# Patient Record
Sex: MALE | Race: WHITE | NOT HISPANIC OR LATINO | URBAN - METROPOLITAN AREA
[De-identification: names, ages, dates, MRNs, and addresses within clinical notes are randomized per-mention and may not be internally consistent; named-entity substitution may affect disease eponyms.]

---

## 2020-06-11 ENCOUNTER — INPATIENT (INPATIENT)
Facility: HOSPITAL | Age: 55
LOS: 8 days | Discharge: HOME | DRG: 950 | End: 2020-06-20
Attending: STUDENT IN AN ORGANIZED HEALTH CARE EDUCATION/TRAINING PROGRAM | Admitting: STUDENT IN AN ORGANIZED HEALTH CARE EDUCATION/TRAINING PROGRAM
Payer: MEDICAID

## 2020-06-11 VITALS
RESPIRATION RATE: 15 BRPM | TEMPERATURE: 98 F | HEART RATE: 72 BPM | WEIGHT: 165.79 LBS | SYSTOLIC BLOOD PRESSURE: 132 MMHG | HEIGHT: 66 IN | DIASTOLIC BLOOD PRESSURE: 87 MMHG | OXYGEN SATURATION: 96 %

## 2020-06-11 DIAGNOSIS — I63.9 CEREBRAL INFARCTION, UNSPECIFIED: ICD-10-CM

## 2020-06-11 RX ORDER — ENOXAPARIN SODIUM 100 MG/ML
40 INJECTION SUBCUTANEOUS DAILY
Refills: 0 | Status: DISCONTINUED | OUTPATIENT
Start: 2020-06-11 | End: 2020-06-20

## 2020-06-11 RX ORDER — ATORVASTATIN CALCIUM 80 MG/1
40 TABLET, FILM COATED ORAL AT BEDTIME
Refills: 0 | Status: DISCONTINUED | OUTPATIENT
Start: 2020-06-11 | End: 2020-06-20

## 2020-06-11 RX ORDER — MECLIZINE HCL 12.5 MG
25 TABLET ORAL EVERY 8 HOURS
Refills: 0 | Status: DISCONTINUED | OUTPATIENT
Start: 2020-06-11 | End: 2020-06-15

## 2020-06-11 RX ORDER — AMLODIPINE BESYLATE 2.5 MG/1
5 TABLET ORAL DAILY
Refills: 0 | Status: DISCONTINUED | OUTPATIENT
Start: 2020-06-11 | End: 2020-06-20

## 2020-06-11 RX ORDER — ACETAMINOPHEN 500 MG
650 TABLET ORAL EVERY 6 HOURS
Refills: 0 | Status: DISCONTINUED | OUTPATIENT
Start: 2020-06-11 | End: 2020-06-20

## 2020-06-11 RX ORDER — ASPIRIN/CALCIUM CARB/MAGNESIUM 324 MG
81 TABLET ORAL DAILY
Refills: 0 | Status: DISCONTINUED | OUTPATIENT
Start: 2020-06-11 | End: 2020-06-20

## 2020-06-11 RX ORDER — SENNA PLUS 8.6 MG/1
2 TABLET ORAL AT BEDTIME
Refills: 0 | Status: DISCONTINUED | OUTPATIENT
Start: 2020-06-11 | End: 2020-06-20

## 2020-06-11 RX ORDER — CLOPIDOGREL BISULFATE 75 MG/1
75 TABLET, FILM COATED ORAL DAILY
Refills: 0 | Status: DISCONTINUED | OUTPATIENT
Start: 2020-06-11 | End: 2020-06-20

## 2020-06-11 RX ADMIN — SENNA PLUS 2 TABLET(S): 8.6 TABLET ORAL at 22:20

## 2020-06-11 RX ADMIN — ATORVASTATIN CALCIUM 40 MILLIGRAM(S): 80 TABLET, FILM COATED ORAL at 22:20

## 2020-06-11 NOTE — H&P ADULT - HISTORY OF PRESENT ILLNESS
Pt is a 56 y/o with PMHx of poorly controlled HTN who presented to [facility] on 6/4/20 with left sided facial numbness and left sided body numbness upon waking. The numbness was associated with diplopia, nausea, and 1 episode of NBNB vomiting. Pt also noticed paresthesias in his LUE. MRI revealed an acute infarct in the right anisa. Pt was COVID negative upon admission. Pt's course also showed calcified right lung granulomas on CT chest.

## 2020-06-11 NOTE — H&P ADULT - NSHPREVIEWOFSYSTEMS_GEN_ALL_CORE
CONSTITUTIONAL: No fevers or chills  EYES/ENT: +mild blurriness;  No vertigo or throat pain   NECK: No pain or stiffness  RESPIRATORY: No cough, wheezing, or shortness of breath  CARDIOVASCULAR: No chest pain or palpitations  GASTROINTESTINAL: No abdominal or epigastric pain. No nausea or vomiting. No diarrhea or constipation.   GENITOURINARY: No dysuria, frequency or hematuria  NEUROLOGICAL: + numbness left hand, +mild weakness  SKIN: No itching, rashes

## 2020-06-11 NOTE — H&P ADULT - ATTENDING COMMENTS
Pt. seen 6/12/20 AM.  Agree with documentation above as per resident on call with amendments made as appropriate. Patient medically stable. Appropriate for acute rehabilitation.      Spoke with pt. via [employer]-- Pt. reports difficulty sleeping at night due to noise in the room.  He declines taking sleep medication for tonight and requesting ear plugs as needed.  Pt. denies pain.  reports some lightheadedness but seems to be mild.  No vertigo.  on enhanced supervision for impulsivity.  no agitation overnight.    Vital Signs Last 24 Hrs  T(C): 36.3 (12 Jun 2020 08:03), Max: 36.6 (11 Jun 2020 20:00)  T(F): 97.4 (12 Jun 2020 08:03), Max: 97.9 (11 Jun 2020 20:00)  HR: 53 (12 Jun 2020 08:03) (53 - 72)  BP: 149/97 (12 Jun 2020 08:03) (130/92 - 149/97)  BP(mean): --  RR: 14 (12 Jun 2020 08:03) (14 - 15)  SpO2: 98% (12 Jun 2020 08:03) (96% - 98%)    Physical exam as amended above                          14.7   7.51  )-----------( 286      ( 12 Jun 2020 05:51 )             44.9   06-12    139  |  106  |  16  ----------------------------<  94  3.9   |  25  |  0.84    Ca    8.7      12 Jun 2020 05:51     54 y/o with PMHx of poorly controlled HTN who presented to [facility] on 6/4/20 with left sided facial numbness and left sided body numbness upon waking. Pt was found to have an acute infarct of his right anisa on MRI now with vestibular disorder, sensory impairment, gait and ADL impairment and impulsivity.     Dizziness-- central due to CVA,  Will stop Meclizine as can have sedating/cognitive SEs.  Pt. has not been needing and his symptoms seem to be tolerable.      Sleep-- add Melatonin PRN.    cont. other medications.    Rehab plan of care explained to pt. All questions answered.

## 2020-06-11 NOTE — H&P ADULT - NSHPSOCIALHISTORY_GEN_ALL_CORE
SOCIAL HISTORY  - Smoking: former smoker, quit 30 years ago  - Alcohol: occasionally consumes alcohol  - Drug Use: denied    FUNCTIONAL HISTORY  Pt lives in a private home with his spouse and has 2 children. The house has 10 steps to enter. Prior to admission, pt was fully independent with ADLs and ambulation.    CURRENT FUNCTIONAL STATUS  Per PT note 6/10/20  Sit/stand CG and RW  Ambulation 30'x2 with RW and Min A  From a seated position in chair, patient performed long-arch quads, seated rows for posture using single-axis cane and manual resistance, and hip abductions against manual resistance 1x10 each. Patient requires verbal and visual cues for proper execution of therapeutic exercises. SOCIAL HISTORY  - Smoking: former smoker, quit 30 years ago  - Alcohol: occasionally consumes alcohol  - Drug Use: denied    FUNCTIONAL HISTORY  Pt lives in a private home in [place] with his spouse and has 2 children. The house has 10 steps to enter. Prior to admission, pt was fully independent with ADLs and ambulation.  He was studying for a [employer]    CURRENT FUNCTIONAL STATUS  Per PT note 6/10/20  Sit/stand CG and RW  Ambulation 30'x2 with RW and Min A  From a seated position in chair, patient performed long-arch quads, seated rows for posture using single-axis cane and manual resistance, and hip abductions against manual resistance 1x10 each. Patient requires verbal and visual cues for proper execution of therapeutic exercises.

## 2020-06-11 NOTE — H&P ADULT - NSHPLABSRESULTS_GEN_ALL_CORE
Laboratory-Chemistry:    6/8/20   Glucose: 130    Sodium: 140    Potassium: 4.0    Blood Urea Nitrogen : 16.6    Creatinine: 0.86   Hematology:    6/8/20   WBC: 6.09    HgB: 14.9    Hct: 46.1    Platelets: 299   Cultures:     6/4/20 SARS CoV 2 PCR - Not detected   Radiology:     6/4/20 CT head  Unremarkable head CT examination.    6/4/20 CTA Head/Neck  No significant stenosis in the cervical arterial vasculature.   6/4/20 CXR  Mild pulmonary scarring and probable calcified right lung granulomas.     6/4/20 CT Chest  NUMEROUS CALCIFIED GRANULOMAS IN THE RIGHT LUNG AS WELL AS SCARRING WITH VOLUME LOSS RIGHT UPPER LOBE CALCIFIED MEDIASTINAL LYMPH NODES THE ABOVE FINDINGS CONSISTENT WITH PRIOR GRANULOMATOUS INFECTION.    6/5/20 MRI Brain  Acute infarct in the right anisa. No acute intracranial hemorrhage. Chronic left corona radiata lacunar infarct. Minimal chronic small vessel ischemic changes in the cerebral white matter.   IVs:  IV Access: Saline Lock

## 2020-06-11 NOTE — H&P ADULT - NSHPPHYSICALEXAM_GEN_ALL_CORE
Constitutional - NAD, Comfortably lying in bed  HEENT - NCAT, EOMI  Neck - Supple, No limited ROM  Chest - Breathing comfortably, No wheezing, clear to auscultation bilaterally  Cardiovascular - S1S2, RRR  Abdomen - Soft, nontender, nondistended, normoactive bowel sounds   Extremities - No C/C/E, No calf tenderness   Neurologic Exam -                    Cognitive - Awake, Alert, AAO to self, place, date, year, situation     Communication - Fluent, very mild dysarthria, repetition intact     Cranial Nerves - mild left facial droop, EOMI, tongue midline, no facial numbness     Motor -                     LEFT    UE - ShAB 5/5, EF 5/5, EE 5/5, WE 4/5,  4/5                    RIGHT UE - ShAB 5/5, EF 5/5, EE 5/5, WE 5/5,  5/5                    LEFT    LE - HF 5/5, KE 5/5, DF 5/5, PF 5/5                    RIGHT LE - HF 5/5, KE 5/5, DF 5/5, PF 5/5        Sensory - deficit at left palm     Coordination - FTN intact, slightly more clumsy with left hand  Psychiatric - Mood stable, Affect WNL Constitutional - NAD, Comfortably lying in bed  HEENT - NCAT, EOMI  Neck - Supple, No limited ROM  Chest - Breathing comfortably, No wheezing, clear to auscultation bilaterally  Cardiovascular - S1S2, RRR  Abdomen - Soft, nontender, nondistended, normoactive bowel sounds   Extremities - No C/C/E, No calf tenderness   Neurologic Exam -                    Cognitive - Awake, Alert, AAO to self, place, date, year, situation    Recall: 3/3 spontaneously after 3 mins     Attn: good     Communication - Fluent, very mild dysarthria, repetition intact     Cranial Nerves - mild left facial droop, EOMI, tongue midline, no facial numbness     Motor -                     LEFT    UE - ShAB 5/5, EF 5/5, EE 5/5, WE 4/5,  4/5                    RIGHT UE - ShAB 5/5, EF 5/5, EE 5/5, WE 5/5,  5/5                    LEFT    LE - HF 5/5, KE 5/5, DF 5/5, PF 5/5                    RIGHT LE - HF 5/5, KE 5/5, DF 5/5, PF 5/5        Sensory - deficit at left palm     Coordination - FTN intact, HTS intact  Psychiatric - Mood stable, Affect WNL

## 2020-06-11 NOTE — H&P ADULT - ASSESSMENT
Pt is a 56 y/o with PMHx of poorly controlled HTN who presented to [facility] on 6/4/20 with left sided facial numbness and left sided body numbness upon waking. Pt was found to have an acute infarct of his right anisa on MRI.    #CVA  - right sided anisa infarct  - Aspirin  - Plavix  - Lipitor  - Meclizine prn for dizziness  - PT/OT/SLP  - BP control    #HTN  - Amlodipine 5mg daily  - Hospitalist follow up    #Pain  - Tylenol PRN    #GI  - Senna    #Diet  - Regular    #DVT ppx  - Lovenox    MEDICAL PROGNOSIS: GOOD            REHAB POTENTIAL: GOOD             ESTIMATED DISPOSITION: HOME WITH HOME CARE            ELOS: 10-14 Days   EXPECTED THERAPY:     P.T. 1hr/day       O.T. 1hr/day      S.L.P. 1hr/day      EXP FREQUENCY: 5 days per 7 day period     PRESCREEN COMPARISON:   I have reviewed the prescreen information and I have found no relevant changes between the preadmission screening and my post admission evaluation     RATIONALE FOR INPATIENT ADMISSION - Patient demonstrates the following: (check all that apply)  [X] Medically appropriate for rehabilitation admission  [X] Has attainable rehab goals with an appropriate initial discharge plan  [X] Has rehabilitation potential (expected to make a significant improvement within a reasonable period of time)   [X] Requires close medical management by a rehab physician, rehab nursing care, Hospitalist and comprehensive interdisciplinary team (including PT, OT, & or SLP, Prosthetics and Orthotics) Pt is a 54 y/o with PMHx of poorly controlled HTN who presented to [facility] on 6/4/20 with left sided facial numbness and left sided body numbness upon waking. Pt was found to have an acute infarct of his right anisa on MRI now with vestibular disorder, sensory impairment, gait and ADL impairment and impulsivity.     #CVA  - right sided anisa infarct  - Aspirin  - Plavix  - Lipitor  - Meclizine prn for dizziness  - PT/OT/SLP  - BP control    #HTN  - Amlodipine 5mg daily  - Hospitalist follow up    #Pain  - Tylenol PRN    #GI  - Senna    #Diet  - Regular    #DVT ppx  - Lovenox    MEDICAL PROGNOSIS: GOOD            REHAB POTENTIAL: GOOD             ESTIMATED DISPOSITION: HOME WITH HOME CARE            ELOS: 10-14 Days   EXPECTED THERAPY:     P.T. 1hr/day       O.T. 1hr/day      S.L.P. 1hr/day      EXP FREQUENCY: 5 days per 7 day period     PRESCREEN COMPARISON:   I have reviewed the prescreen information and I have found no relevant changes between the preadmission screening and my post admission evaluation     RATIONALE FOR INPATIENT ADMISSION - Patient demonstrates the following: (check all that apply)  [X] Medically appropriate for rehabilitation admission  [X] Has attainable rehab goals with an appropriate initial discharge plan  [X] Has rehabilitation potential (expected to make a significant improvement within a reasonable period of time)   [X] Requires close medical management by a rehab physician, rehab nursing care, Hospitalist and comprehensive interdisciplinary team (including PT, OT, & or SLP, Prosthetics and Orthotics)

## 2020-06-12 LAB
ANION GAP SERPL CALC-SCNC: 8 MMOL/L — SIGNIFICANT CHANGE UP (ref 5–17)
BUN SERPL-MCNC: 16 MG/DL — SIGNIFICANT CHANGE UP (ref 7–23)
CALCIUM SERPL-MCNC: 8.7 MG/DL — SIGNIFICANT CHANGE UP (ref 8.4–10.5)
CHLORIDE SERPL-SCNC: 106 MMOL/L — SIGNIFICANT CHANGE UP (ref 96–108)
CO2 SERPL-SCNC: 25 MMOL/L — SIGNIFICANT CHANGE UP (ref 22–31)
CREAT SERPL-MCNC: 0.84 MG/DL — SIGNIFICANT CHANGE UP (ref 0.5–1.3)
GLUCOSE SERPL-MCNC: 94 MG/DL — SIGNIFICANT CHANGE UP (ref 70–99)
HCT VFR BLD CALC: 44.9 % — SIGNIFICANT CHANGE UP (ref 39–50)
HCV AB S/CO SERPL IA: 0.15 S/CO — SIGNIFICANT CHANGE UP (ref 0–0.99)
HCV AB SERPL-IMP: SIGNIFICANT CHANGE UP
HGB BLD-MCNC: 14.7 G/DL — SIGNIFICANT CHANGE UP (ref 13–17)
MCHC RBC-ENTMCNC: 29.4 PG — SIGNIFICANT CHANGE UP (ref 27–34)
MCHC RBC-ENTMCNC: 32.7 GM/DL — SIGNIFICANT CHANGE UP (ref 32–36)
MCV RBC AUTO: 89.8 FL — SIGNIFICANT CHANGE UP (ref 80–100)
NRBC # BLD: 0 /100 WBCS — SIGNIFICANT CHANGE UP (ref 0–0)
PLATELET # BLD AUTO: 286 K/UL — SIGNIFICANT CHANGE UP (ref 150–400)
POTASSIUM SERPL-MCNC: 3.9 MMOL/L — SIGNIFICANT CHANGE UP (ref 3.5–5.3)
POTASSIUM SERPL-SCNC: 3.9 MMOL/L — SIGNIFICANT CHANGE UP (ref 3.5–5.3)
RBC # BLD: 5 M/UL — SIGNIFICANT CHANGE UP (ref 4.2–5.8)
RBC # FLD: 12.6 % — SIGNIFICANT CHANGE UP (ref 10.3–14.5)
SARS-COV-2 IGG SERPL QL IA: NEGATIVE — SIGNIFICANT CHANGE UP
SARS-COV-2 IGM SERPL IA-ACNC: <0.1 INDEX — SIGNIFICANT CHANGE UP
SARS-COV-2 RNA SPEC QL NAA+PROBE: SIGNIFICANT CHANGE UP
SODIUM SERPL-SCNC: 139 MMOL/L — SIGNIFICANT CHANGE UP (ref 135–145)
WBC # BLD: 7.51 K/UL — SIGNIFICANT CHANGE UP (ref 3.8–10.5)
WBC # FLD AUTO: 7.51 K/UL — SIGNIFICANT CHANGE UP (ref 3.8–10.5)

## 2020-06-12 PROCEDURE — 99223 1ST HOSP IP/OBS HIGH 75: CPT

## 2020-06-12 PROCEDURE — 99223 1ST HOSP IP/OBS HIGH 75: CPT | Mod: GC

## 2020-06-12 RX ADMIN — Medication 650 MILLIGRAM(S): at 12:27

## 2020-06-12 RX ADMIN — CLOPIDOGREL BISULFATE 75 MILLIGRAM(S): 75 TABLET, FILM COATED ORAL at 12:26

## 2020-06-12 RX ADMIN — AMLODIPINE BESYLATE 5 MILLIGRAM(S): 2.5 TABLET ORAL at 06:30

## 2020-06-12 RX ADMIN — ATORVASTATIN CALCIUM 40 MILLIGRAM(S): 80 TABLET, FILM COATED ORAL at 20:20

## 2020-06-12 RX ADMIN — Medication 81 MILLIGRAM(S): at 12:26

## 2020-06-12 RX ADMIN — SENNA PLUS 2 TABLET(S): 8.6 TABLET ORAL at 20:20

## 2020-06-12 RX ADMIN — ENOXAPARIN SODIUM 40 MILLIGRAM(S): 100 INJECTION SUBCUTANEOUS at 12:26

## 2020-06-12 NOTE — CONSULT NOTE ADULT - ASSESSMENT
Mr. Acosta is a pleasant 55 year-old male with hx of HTN that was found to have an acute ischemic infarct of his right anisa. He is currently admitted to [facility] for acute rehabilitation of his functional deficits following his CVA.       Problem  1.	CVA  2.	Hypertension  3.	Hyperlipidemia   4.	Constipation    Plan  1.	ASA 81mg qd, plavix 75mg qd, atorvastatin 40mg qhs, meclizine prn for dizzines  2.	amlodipine 5mg qd with hold parameters. will monitor for 24 hours. If persistently above 140 will start HCTZ 12.5mg qd.   3.	statin as above. please obtain lipid profile in AM so we can target his LDL following stroke  4.	senna qd    Diet: per primary  Pain Control: per primary  DVT ppx: lovenox  Case discussed with primary team  If a clinical question should arise regarding this patient, please do not hesitate to contact me at [phone] until 7pm. If after 7pm, please contact on-call hospitalist

## 2020-06-12 NOTE — DIETITIAN INITIAL EVALUATION ADULT. - OTHER INFO
Pt is a 54 y/o with PMHx of poorly controlled HTN now s/p acute infarct of his right anisa on MRI now with vestibular disorder, sensory impairment, gait and ADL impairment and impulsivity.     Pt seen at lunch with >75% intake of meal observed. Pt is Mandarin speaking, but can communicate somewhat in English.  phone offered x 3, but pt declined. Pt reports excellent appetite. He denies food allergies/intolerance, no recent weight changes. Denies GI distress- last BM 6/11. Reviewed importance of avoiding added salt/salty foods. Pt reports good understanding. Recommend change diet to DASH/TLC. No skin breakdown or edema noted. Menu reviewed and preferences obtained.

## 2020-06-12 NOTE — CONSULT NOTE ADULT - SUBJECTIVE AND OBJECTIVE BOX
Mr Karla is a pleasant 55 year-ol male that presented with a chief complaint of weakness in the setting of a CVA.       HPI:  Mr Karla is a pleasant 55 year-old male with hx of HTN that presented on 6/4 with acute onset left sided facial and body numbness. These symptoms were present in the setting of concurrent diplopia, nausea and vomiting. MR brain showed an acute infarct in the right anisa. Hospital course was also notable for a calcified right lung granuloma. He was medically optimized and sent to [facility] for acute rehabilitation of his functional deficits.     Discussed with patient via  the purpose of rehab and the need to inform nursing staff of any changes that he experiences so we can address them promptly.  He expresses understanding of this. He notes marked improvement of his symptoms since his initial presentation to the hospital.           PAST MEDICAL & SURGICAL HISTORY:  HTN (hypertension)    FMHx: Mother with DM          Substance Use (street drugs): denies  Tobacco Usage:  former smoker   Alcohol Usage: denies  Sexual History: denies  Recent Travel: denies      Allergies    No Known Allergies    Intolerances            REVIEW OF SYSTEMS:  CONSTITUTIONAL: No fever, weight loss, or fatigue  EYES: No eye pain, visual disturbances, or discharge  ENMT:  No difficulty hearing, tinnitus, vertigo; No sinus or throat pain  NECK: No pain or stiffness  RESPIRATORY: No cough, wheezing, chills or hemoptysis; No shortness of breath  CARDIOVASCULAR: No chest pain, palpitations, dizziness, or leg swelling  GASTROINTESTINAL: No abdominal or epigastric pain. No nausea, vomiting, or hematemesis; No diarrhea or constipation. No melena or hematochezia.  GENITOURINARY: No dysuria, frequency, hematuria, or incontinence  NEUROLOGICAL: No headaches, memory loss, loss of strength, numbness, or tremors  SKIN: No itching, burning, rashes, or lesions   LYMPH NODES: No enlarged glands  ENDOCRINE: No heat or cold intolerance; No hair loss  MUSCULOSKELETAL: No joint pain or swelling; No muscle, back, or extremity pain  PSYCHIATRIC: No depression, anxiety, mood swings, or difficulty sleeping  HEME/LYMPH: No easy bruising, or bleeding gums  ALLERY AND IMMUNOLOGIC: No hives or eczema    ALL ROS REVIEWED AND NORMAL EXCEPT AS STATED ABOVE    T(C): 36.3 (06-12-20 @ 08:03), Max: 36.6 (06-11-20 @ 20:00)  HR: 53 (06-12-20 @ 08:03) (53 - 72)  BP: 149/97 (06-12-20 @ 08:03) (130/92 - 149/97)  RR: 14 (06-12-20 @ 08:03) (14 - 15)  SpO2: 98% (06-12-20 @ 08:03) (96% - 98%)  Wt(kg): --Vital Signs Last 24 Hrs  T(C): 36.3 (12 Jun 2020 08:03), Max: 36.6 (11 Jun 2020 20:00)  T(F): 97.4 (12 Jun 2020 08:03), Max: 97.9 (11 Jun 2020 20:00)  HR: 53 (12 Jun 2020 08:03) (53 - 72)  BP: 149/97 (12 Jun 2020 08:03) (130/92 - 149/97)  BP(mean): --  RR: 14 (12 Jun 2020 08:03) (14 - 15)  SpO2: 98% (12 Jun 2020 08:03) (96% - 98%)    PHYSICAL EXAM:  GENERAL: NAD, well-groomed, well-developed  HEAD:  Atraumatic, Normocephalic  EYES: EOMI, PERRLA, conjunctiva and sclera clear  ENMT: No tonsillar erythema, exudates, or enlargement; Moist mucous membranes, Good dentition, No lesions  NECK: Supple, No JVD, Normal thyroid  NERVOUS SYSTEM:  Alert & Oriented X3, Good concentration; Motor Strength 5/5 B/L upper and lower extremities;   CHEST/LUNG: Clear to percussion bilaterally; No rales, rhonchi, wheezing, or rubs  HEART: Regular rate and rhythm; No murmurs, rubs, or gallops  ABDOMEN: Soft, Nontender, Nondistended; Bowel sounds present  EXTREMITIES:  2+ Peripheral Pulses, No clubbing, cyanosis, or edema  LYMPH: No lymphadenopathy noted  SKIN: No rashes or lesions    LABS:                        14.7   7.51  )-----------( 286      ( 12 Jun 2020 05:51 )             44.9     06-12    139  |  106  |  16  ----------------------------<  94  3.9   |  25  |  0.84    Ca    8.7      12 Jun 2020 05:51           CAPILLARY BLOOD GLUCOSE                RADIOLOGY & ADDITIONAL TESTS:    Consultant(s) Notes Reviewed:  [x ] YES  [ ] NO  Care Discussed with Consultants/Other Providers [ x] YES  [ ] NO

## 2020-06-12 NOTE — DIETITIAN INITIAL EVALUATION ADULT. - PHYSICAL APPEARANCE
well nourished/other (specify)/No overt signs of muscle wasting/fat loss Height: 5'6", Weight: (6/12) 171.9lbs, BMI: 27.8  IBW: 142lbs +/- 10%

## 2020-06-13 LAB
CHOLEST SERPL-MCNC: 105 MG/DL — SIGNIFICANT CHANGE UP (ref 10–199)
HDLC SERPL-MCNC: 37 MG/DL — LOW
LIPID PNL WITH DIRECT LDL SERPL: 57 MG/DL — SIGNIFICANT CHANGE UP
TOTAL CHOLESTEROL/HDL RATIO MEASUREMENT: 2.9 RATIO — LOW (ref 3.4–9.6)
TRIGL SERPL-MCNC: 57 MG/DL — SIGNIFICANT CHANGE UP (ref 10–149)

## 2020-06-13 PROCEDURE — 99232 SBSQ HOSP IP/OBS MODERATE 35: CPT

## 2020-06-13 PROCEDURE — 99232 SBSQ HOSP IP/OBS MODERATE 35: CPT | Mod: GC

## 2020-06-13 RX ORDER — HYDROCHLOROTHIAZIDE 25 MG
12.5 TABLET ORAL
Refills: 0 | Status: DISCONTINUED | OUTPATIENT
Start: 2020-06-13 | End: 2020-06-20

## 2020-06-13 RX ADMIN — AMLODIPINE BESYLATE 5 MILLIGRAM(S): 2.5 TABLET ORAL at 05:34

## 2020-06-13 RX ADMIN — Medication 12.5 MILLIGRAM(S): at 11:23

## 2020-06-13 RX ADMIN — Medication 81 MILLIGRAM(S): at 11:23

## 2020-06-13 RX ADMIN — ATORVASTATIN CALCIUM 40 MILLIGRAM(S): 80 TABLET, FILM COATED ORAL at 20:51

## 2020-06-13 RX ADMIN — CLOPIDOGREL BISULFATE 75 MILLIGRAM(S): 75 TABLET, FILM COATED ORAL at 11:23

## 2020-06-13 RX ADMIN — SENNA PLUS 2 TABLET(S): 8.6 TABLET ORAL at 20:51

## 2020-06-13 RX ADMIN — ENOXAPARIN SODIUM 40 MILLIGRAM(S): 100 INJECTION SUBCUTANEOUS at 11:23

## 2020-06-13 NOTE — PROGRESS NOTE ADULT - SUBJECTIVE AND OBJECTIVE BOX
Patient seen and examined at bedside. No overnight events per nursing or chart review. Patient is without major complaints as his 10 point ROS is negative. He does note some improvement in his dizziness.     ALLERGIES:  No Known Allergies    MEDICATIONS  (STANDING):  amLODIPine   Tablet 5 milliGRAM(s) Oral daily  aspirin enteric coated 81 milliGRAM(s) Oral daily  atorvastatin 40 milliGRAM(s) Oral at bedtime  clopidogrel Tablet 75 milliGRAM(s) Oral daily  enoxaparin Injectable 40 milliGRAM(s) SubCutaneous daily  senna 2 Tablet(s) Oral at bedtime    MEDICATIONS  (PRN):  acetaminophen   Tablet .. 650 milliGRAM(s) Oral every 6 hours PRN Temp greater or equal to 38C (100.4F), Mild Pain (1 - 3)  meclizine 25 milliGRAM(s) Oral every 8 hours PRN Dizziness    Vital Signs Last 24 Hrs  T(F): 97.8 (12 Jun 2020 20:16), Max: 97.8 (12 Jun 2020 20:16)  HR: 77 (13 Jun 2020 05:33) (73 - 77)  BP: 132/80 (13 Jun 2020 05:33) (132/80 - 149/98)  RR: 14 (12 Jun 2020 20:16) (14 - 14)  SpO2: 96% (12 Jun 2020 20:16) (96% - 96%)  I&O's Summary    BMI (kg/m2): 26.8 (06-11-20 @ 20:00)  PHYSICAL EXAM:  Gen: nad, resting in bed  Neuro: aaox3, no focal deficits  Heent: eomi b/l, no jvd, no oral exudates  Pulm: cta b/l, no w/r/r  CV: +s1s2, no m/r/g  Ab: soft, nt/nd, normoactive bs x 4  Extrem: no edema, pulses intact and equal      LABS:                        14.7   7.51  )-----------( 286      ( 12 Jun 2020 05:51 )             44.9       06-12    139  |  106  |  16  ----------------------------<  94  3.9   |  25  |  0.84    Ca    8.7      12 Jun 2020 05:51       eGFR if Non African American: 97 mL/min/1.73M2 (06-12-20 @ 05:51)  eGFR if : 113 mL/min/1.73M2 (06-12-20 @ 05:51)

## 2020-06-14 PROCEDURE — 99232 SBSQ HOSP IP/OBS MODERATE 35: CPT

## 2020-06-14 PROCEDURE — 99232 SBSQ HOSP IP/OBS MODERATE 35: CPT | Mod: GC

## 2020-06-14 RX ORDER — POLYETHYLENE GLYCOL 3350 17 G/17G
17 POWDER, FOR SOLUTION ORAL DAILY
Refills: 0 | Status: DISCONTINUED | OUTPATIENT
Start: 2020-06-14 | End: 2020-06-20

## 2020-06-14 RX ADMIN — Medication 12.5 MILLIGRAM(S): at 11:24

## 2020-06-14 RX ADMIN — SENNA PLUS 2 TABLET(S): 8.6 TABLET ORAL at 20:46

## 2020-06-14 RX ADMIN — ATORVASTATIN CALCIUM 40 MILLIGRAM(S): 80 TABLET, FILM COATED ORAL at 20:46

## 2020-06-14 RX ADMIN — CLOPIDOGREL BISULFATE 75 MILLIGRAM(S): 75 TABLET, FILM COATED ORAL at 11:24

## 2020-06-14 RX ADMIN — ENOXAPARIN SODIUM 40 MILLIGRAM(S): 100 INJECTION SUBCUTANEOUS at 11:24

## 2020-06-14 RX ADMIN — Medication 81 MILLIGRAM(S): at 11:24

## 2020-06-14 RX ADMIN — POLYETHYLENE GLYCOL 3350 17 GRAM(S): 17 POWDER, FOR SOLUTION ORAL at 20:45

## 2020-06-14 RX ADMIN — AMLODIPINE BESYLATE 5 MILLIGRAM(S): 2.5 TABLET ORAL at 05:29

## 2020-06-14 NOTE — PROGRESS NOTE ADULT - SUBJECTIVE AND OBJECTIVE BOX
Cc: Gait dysfunction    HPI: Patient with no new medical issues today.  HCTZ started for elevated BP- appreciate hospitalist f/u.   Pain controlled, no chest pain, no N/V, no Fevers/Chills. No other new ROS  Has been tolerating rehabilitation program.    MEDICATIONS  (STANDING):  amLODIPine   Tablet 5 milliGRAM(s) Oral daily  aspirin enteric coated 81 milliGRAM(s) Oral daily  atorvastatin 40 milliGRAM(s) Oral at bedtime  clopidogrel Tablet 75 milliGRAM(s) Oral daily  enoxaparin Injectable 40 milliGRAM(s) SubCutaneous daily  hydrochlorothiazide 12.5 milliGRAM(s) Oral <User Schedule>  senna 2 Tablet(s) Oral at bedtime    MEDICATIONS  (PRN):  acetaminophen   Tablet .. 650 milliGRAM(s) Oral every 6 hours PRN Temp greater or equal to 38C (100.4F), Mild Pain (1 - 3)  meclizine 25 milliGRAM(s) Oral every 8 hours PRN Dizziness    Vital Signs Last 24 Hrs  T(C): 36.6 (13 Jun 2020 19:51), Max: 36.6 (13 Jun 2020 19:51)  T(F): 97.8 (13 Jun 2020 19:51), Max: 97.8 (13 Jun 2020 19:51)  HR: 53 (14 Jun 2020 05:28) (53 - 82)  BP: 145/85 (14 Jun 2020 05:28) (136/87 - 150/95)  BP(mean): --  RR: 15 (13 Jun 2020 19:51) (15 - 16)  SpO2: 96% (13 Jun 2020 19:51) (96% - 99%)    In NAD  HEENT- EOMI  Heart- Flip, S1S2  Lungs- CTA bl.  Abd- + BS, NT  Ext- No calf pain  Neuro- Exam unchanged      Imp: Patient with diagnosis of CVA admitted for comprehensive acute rehabilitation.    Plan:  - Continue therapies  - DVT prophylaxis- Lovenox  - Skin- Turn q2h, check skin daily  - Continue to monitor BP on HCTZ  - Continue current medications; patient medically stable.   - Patient is stable to continue current rehabilitation program.

## 2020-06-14 NOTE — PROGRESS NOTE ADULT - SUBJECTIVE AND OBJECTIVE BOX
Patient seen and examined at bedside. No overnight events per nursing or chart review. Patient notes that he has had some neck pain that radiates to his occiput. He states that pain is resolved after aspirin administration     ALLERGIES:  No Known Allergies    MEDICATIONS  (STANDING):  amLODIPine   Tablet 5 milliGRAM(s) Oral daily  aspirin enteric coated 81 milliGRAM(s) Oral daily  atorvastatin 40 milliGRAM(s) Oral at bedtime  clopidogrel Tablet 75 milliGRAM(s) Oral daily  enoxaparin Injectable 40 milliGRAM(s) SubCutaneous daily  hydrochlorothiazide 12.5 milliGRAM(s) Oral <User Schedule>  senna 2 Tablet(s) Oral at bedtime    MEDICATIONS  (PRN):  acetaminophen   Tablet .. 650 milliGRAM(s) Oral every 6 hours PRN Temp greater or equal to 38C (100.4F), Mild Pain (1 - 3)  meclizine 25 milliGRAM(s) Oral every 8 hours PRN Dizziness    Vital Signs Last 24 Hrs  T(F): 97.8 (14 Jun 2020 07:45), Max: 97.8 (13 Jun 2020 19:51)  HR: 61 (14 Jun 2020 07:45) (53 - 82)  BP: 125/86 (14 Jun 2020 07:45) (125/86 - 150/95)  RR: 12 (14 Jun 2020 07:45) (12 - 16)  SpO2: 100% (14 Jun 2020 07:45) (96% - 100%)  I&O's Summary    13 Jun 2020 07:01  -  14 Jun 2020 07:00  --------------------------------------------------------  IN: 600 mL / OUT: 0 mL / NET: 600 mL      BMI (kg/m2): 26.8 (06-11-20 @ 20:00)  PHYSICAL EXAM:  Gen: nad, resting in bed  Neuro: aaox3, unchanged from prior exams.   Heent: eomi b/l, no jvd, no oral exudates  Pulm: cta b/l, no w/r/r  CV: +s1s2, no m/r/g  Ab: soft, nt/nd, normoactive bs x 4  Extrem: no edema, pulses intact and equal      LABS:                        14.7   7.51  )-----------( 286      ( 12 Jun 2020 05:51 )             44.9       06-12    139  |  106  |  16  ----------------------------<  94  3.9   |  25  |  0.84    Ca    8.7      12 Jun 2020 05:51       eGFR if Non African American: 97 mL/min/1.73M2 (06-12-20 @ 05:51)  eGFR if : 113 mL/min/1.73M2 (06-12-20 @ 05:51)             06-13 Chol 105 mg/dL LDL 57 mg/dL HDL 37 mg/dL Trig 57 mg/dL

## 2020-06-15 LAB
ANION GAP SERPL CALC-SCNC: 4 MMOL/L — LOW (ref 5–17)
BUN SERPL-MCNC: 16 MG/DL — SIGNIFICANT CHANGE UP (ref 7–23)
CALCIUM SERPL-MCNC: 8.9 MG/DL — SIGNIFICANT CHANGE UP (ref 8.4–10.5)
CHLORIDE SERPL-SCNC: 104 MMOL/L — SIGNIFICANT CHANGE UP (ref 96–108)
CO2 SERPL-SCNC: 32 MMOL/L — HIGH (ref 22–31)
CREAT SERPL-MCNC: 0.91 MG/DL — SIGNIFICANT CHANGE UP (ref 0.5–1.3)
GLUCOSE SERPL-MCNC: 98 MG/DL — SIGNIFICANT CHANGE UP (ref 70–99)
HCT VFR BLD CALC: 44.4 % — SIGNIFICANT CHANGE UP (ref 39–50)
HGB BLD-MCNC: 14.6 G/DL — SIGNIFICANT CHANGE UP (ref 13–17)
MCHC RBC-ENTMCNC: 29.5 PG — SIGNIFICANT CHANGE UP (ref 27–34)
MCHC RBC-ENTMCNC: 32.9 GM/DL — SIGNIFICANT CHANGE UP (ref 32–36)
MCV RBC AUTO: 89.7 FL — SIGNIFICANT CHANGE UP (ref 80–100)
NRBC # BLD: 0 /100 WBCS — SIGNIFICANT CHANGE UP (ref 0–0)
PLATELET # BLD AUTO: 311 K/UL — SIGNIFICANT CHANGE UP (ref 150–400)
POTASSIUM SERPL-MCNC: 3.9 MMOL/L — SIGNIFICANT CHANGE UP (ref 3.5–5.3)
POTASSIUM SERPL-SCNC: 3.9 MMOL/L — SIGNIFICANT CHANGE UP (ref 3.5–5.3)
RBC # BLD: 4.95 M/UL — SIGNIFICANT CHANGE UP (ref 4.2–5.8)
RBC # FLD: 12.9 % — SIGNIFICANT CHANGE UP (ref 10.3–14.5)
SODIUM SERPL-SCNC: 140 MMOL/L — SIGNIFICANT CHANGE UP (ref 135–145)
WBC # BLD: 7.64 K/UL — SIGNIFICANT CHANGE UP (ref 3.8–10.5)
WBC # FLD AUTO: 7.64 K/UL — SIGNIFICANT CHANGE UP (ref 3.8–10.5)

## 2020-06-15 PROCEDURE — 99232 SBSQ HOSP IP/OBS MODERATE 35: CPT

## 2020-06-15 RX ADMIN — Medication 81 MILLIGRAM(S): at 12:20

## 2020-06-15 RX ADMIN — ENOXAPARIN SODIUM 40 MILLIGRAM(S): 100 INJECTION SUBCUTANEOUS at 12:19

## 2020-06-15 RX ADMIN — Medication 12.5 MILLIGRAM(S): at 12:19

## 2020-06-15 RX ADMIN — CLOPIDOGREL BISULFATE 75 MILLIGRAM(S): 75 TABLET, FILM COATED ORAL at 12:19

## 2020-06-15 RX ADMIN — POLYETHYLENE GLYCOL 3350 17 GRAM(S): 17 POWDER, FOR SOLUTION ORAL at 12:19

## 2020-06-15 RX ADMIN — AMLODIPINE BESYLATE 5 MILLIGRAM(S): 2.5 TABLET ORAL at 05:52

## 2020-06-15 RX ADMIN — SENNA PLUS 2 TABLET(S): 8.6 TABLET ORAL at 21:41

## 2020-06-15 RX ADMIN — ATORVASTATIN CALCIUM 40 MILLIGRAM(S): 80 TABLET, FILM COATED ORAL at 21:40

## 2020-06-15 NOTE — PROGRESS NOTE ADULT - SUBJECTIVE AND OBJECTIVE BOX
HPI:  Pt is a 54 y/o with PMHx of poorly controlled HTN who presented to [facility] on 6/4/20 with left sided facial numbness and left sided body numbness upon waking. The numbness was associated with diplopia, nausea, and 1 episode of NBNB vomiting. Pt also noticed paresthesias in his LUE. MRI revealed an acute infarct in the right anisa. Pt was COVID negative upon admission. Pt's course also showed calcified right lung granulomas on CT chest.    SUBJECTIVE / INTERVAL HPI: Patient seen and examined at bedside. Mandarin  [id number] was used during the interview. Pt doing well and had a good weekend. He continues to have left hand numbness and was also having mild posterior lower right head discomfort, but not pain. No other symptoms. He is eating and drinking well, and had a bowel movement yesterday.     REVIEW OF SYSTEMS:    CONSTITUTIONAL: No fevers or chills  EYES/ENT: No visual changes;  No vertigo or throat pain   NECK: No pain or stiffness  RESPIRATORY: No cough, wheezing, or shortness of breath  CARDIOVASCULAR: No chest pain or palpitations  GASTROINTESTINAL: No abdominal or epigastric pain. No nausea or vomiting. No diarrhea or constipation.   GENITOURINARY: No dysuria, frequency or hematuria  NEUROLOGICAL: + numbness, +mild weakness  SKIN: No itching, rashes      VITAL SIGNS:  Vital Signs Last 24 Hrs  T(C): 36.9 (15 Bacilio 2020 10:02), Max: 36.9 (15 Bacilio 2020 10:02)  T(F): 98.4 (15 Bacilio 2020 10:02), Max: 98.4 (15 Bacilio 2020 10:02)  HR: 65 (15 Bacilio 2020 10:02) (56 - 77)  BP: 132/87 (15 Bacilio 2020 10:02) (122/82 - 132/88)  BP(mean): --  RR: 14 (15 Bacilio 2020 10:02) (14 - 14)  SpO2: 98% (15 Bacilio 2020 10:02) (98% - 100%)    PHYSICAL EXAM:    General: NAD, comfortably sitting up in chair  HEENT: NC/AT; PERRL, anicteric sclera; MMM  Neck: supple  Cardiovascular: +S1/S2, RRR  Respiratory: CTA B/L; no W/R/R, no crackles  Gastrointestinal: soft, NT/ND; bowel sounds intact x4  Extremities: warm, well perfused; no edema, clubbing or cyanosis  Neurological: AAOx3; decreased sensation left palm, very mild left hand weakness, no new neurologic changes    MEDICATIONS:  MEDICATIONS  (STANDING):  amLODIPine   Tablet 5 milliGRAM(s) Oral daily  aspirin enteric coated 81 milliGRAM(s) Oral daily  atorvastatin 40 milliGRAM(s) Oral at bedtime  clopidogrel Tablet 75 milliGRAM(s) Oral daily  enoxaparin Injectable 40 milliGRAM(s) SubCutaneous daily  hydrochlorothiazide 12.5 milliGRAM(s) Oral <User Schedule>  polyethylene glycol 3350 17 Gram(s) Oral daily  senna 2 Tablet(s) Oral at bedtime    MEDICATIONS  (PRN):  acetaminophen   Tablet .. 650 milliGRAM(s) Oral every 6 hours PRN Temp greater or equal to 38C (100.4F), Mild Pain (1 - 3)  meclizine 25 milliGRAM(s) Oral every 8 hours PRN Dizziness      ALLERGIES:  Allergies    No Known Allergies    Intolerances        LABS:                        14.6   7.64  )-----------( 311      ( 15 Bacilio 2020 07:07 )             44.4     06-15    140  |  104  |  16  ----------------------------<  98  3.9   |  32<H>  |  0.91    Ca    8.9      15 Bacilio 2020 07:07          CAPILLARY BLOOD GLUCOSE          RADIOLOGY & ADDITIONAL TESTS: Reviewed.

## 2020-06-15 NOTE — PROGRESS NOTE ADULT - SUBJECTIVE AND OBJECTIVE BOX
Patient is a 55 ry old  Male who presents with a chief complaint of CVA (15 Bacilio 2020 11:17).    Patient seen and examined at bedside. No overnight events reported.     Doing well, no issues noted. Had BM yesterday, eating normally.    ALLERGIES:  No Known Allergies    MEDICATIONS  (STANDING):  amLODIPine   Tablet 5 milliGRAM(s) Oral daily  aspirin enteric coated 81 milliGRAM(s) Oral daily  atorvastatin 40 milliGRAM(s) Oral at bedtime  clopidogrel Tablet 75 milliGRAM(s) Oral daily  enoxaparin Injectable 40 milliGRAM(s) SubCutaneous daily  hydrochlorothiazide 12.5 milliGRAM(s) Oral <User Schedule>  polyethylene glycol 3350 17 Gram(s) Oral daily  senna 2 Tablet(s) Oral at bedtime    MEDICATIONS  (PRN):  acetaminophen   Tablet .. 650 milliGRAM(s) Oral every 6 hours PRN Temp greater or equal to 38C (100.4F), Mild Pain (1 - 3)    Vital Signs Last 24 Hrs  T(F): 98.4 (15 Bacilio 2020 10:02), Max: 98.4 (15 Bacilio 2020 10:02)  HR: 65 (15 Bacilio 2020 10:02) (56 - 77)  BP: 132/87 (15 Bacilio 2020 10:02) (122/82 - 132/87)  RR: 14 (15 Bacilio 2020 10:02) (14 - 14)  SpO2: 98% (15 Bacilio 2020 10:02) (98% - 100%)  I&O's Summary    14 Jun 2020 07:01  -  15 Bacilio 2020 07:00  --------------------------------------------------------  IN: 720 mL / OUT: 0 mL / NET: 720 mL      PHYSICAL EXAM:  General: NAD, A/O, sitting comfortably in wheelchair in room, nontoxic  ENT: Moist mucous membranes  Neck: Supple, No JVD  Lungs: Clear to auscultation bilaterally, good air entry, non-labored breathing  Cardio: RRR, S1/S2, No murmur  Abdomen: Soft, Nontender, Nondistended; Bowel sounds present  Extremities: No calf tenderness, No pitting edema    LABS:                        14.6   7.64  )-----------( 311      ( 15 Bacilio 2020 07:07 )             44.4     06-15    140  |  104  |  16  ----------------------------<  98  3.9   |  32  |  0.91    Ca    8.9      15 Bacilio 2020 07:07          eGFR if Non : 93 mL/min/1.73M2 (06-15-20 @ 07:07)  eGFR if : 108 mL/min/1.73M2 (06-15-20 @ 07:07)      06-13 Chol 105 mg/dL LDL 57 mg/dL HDL 37 mg/dL Trig 57 mg/dL    ASSESSMENT AND PLAN:    55 year-old male with hx of HTN who was found to have an acute ischemic infarct of his right anisa. He is currently admitted to [facility] for acute rehabilitation of his functional deficits following his CVA.     1. Acute CVA (right sided anisa infarct) now with functional/ADL deficits  -undergoing comprehensive rehab program-PT/OT/speech  -on ASA and plavix  -on statin  -on BP control (see below)    2. HTN/HLD:  -on norvasc 5mg daily and HCTZ 12.5mg daily--no adjustments made today, BP acceptable 120-130s  -on atorvastatin 40mg daily    3. Bowel regimen- on miralax and senna    4. DVT proph- on lovenox    5. Pain- tylenol prn

## 2020-06-16 PROCEDURE — 99232 SBSQ HOSP IP/OBS MODERATE 35: CPT

## 2020-06-16 RX ADMIN — ATORVASTATIN CALCIUM 40 MILLIGRAM(S): 80 TABLET, FILM COATED ORAL at 21:04

## 2020-06-16 RX ADMIN — Medication 12.5 MILLIGRAM(S): at 12:01

## 2020-06-16 RX ADMIN — SENNA PLUS 2 TABLET(S): 8.6 TABLET ORAL at 21:04

## 2020-06-16 RX ADMIN — CLOPIDOGREL BISULFATE 75 MILLIGRAM(S): 75 TABLET, FILM COATED ORAL at 12:00

## 2020-06-16 RX ADMIN — Medication 81 MILLIGRAM(S): at 12:00

## 2020-06-16 RX ADMIN — POLYETHYLENE GLYCOL 3350 17 GRAM(S): 17 POWDER, FOR SOLUTION ORAL at 21:03

## 2020-06-16 RX ADMIN — AMLODIPINE BESYLATE 5 MILLIGRAM(S): 2.5 TABLET ORAL at 05:43

## 2020-06-16 RX ADMIN — ENOXAPARIN SODIUM 40 MILLIGRAM(S): 100 INJECTION SUBCUTANEOUS at 12:00

## 2020-06-16 NOTE — PROGRESS NOTE ADULT - SUBJECTIVE AND OBJECTIVE BOX
Patient is a 55y old  Male who presents with a chief complaint of CVA (16 Jun 2020 08:27)      Patient seen and examined at bedside.  Today denies having any complaints icnluding fever, chills, SOB, cough, pain, constipation, diarrhea, other complaints. States strength is improving gradually.     ROS: 10 point ROS reviewed and NEG unless noted in HPI     ALLERGIES:  No Known Allergies    MEDICATIONS  (STANDING):  amLODIPine   Tablet 5 milliGRAM(s) Oral daily  aspirin enteric coated 81 milliGRAM(s) Oral daily  atorvastatin 40 milliGRAM(s) Oral at bedtime  clopidogrel Tablet 75 milliGRAM(s) Oral daily  enoxaparin Injectable 40 milliGRAM(s) SubCutaneous daily  hydrochlorothiazide 12.5 milliGRAM(s) Oral <User Schedule>  polyethylene glycol 3350 17 Gram(s) Oral daily  senna 2 Tablet(s) Oral at bedtime    MEDICATIONS  (PRN):  acetaminophen   Tablet .. 650 milliGRAM(s) Oral every 6 hours PRN Temp greater or equal to 38C (100.4F), Mild Pain (1 - 3)    Vital Signs Last 24 Hrs  T(F): 97.8 (16 Jun 2020 08:08), Max: 97.8 (16 Jun 2020 08:08)  HR: 59 (16 Jun 2020 08:08) (50 - 67)  BP: 132/83 (16 Jun 2020 08:08) (119/76 - 132/83)  RR: 15 (16 Jun 2020 08:08) (15 - 15)  SpO2: 98% (16 Jun 2020 08:08) (98% - 99%)  I&O's Summary    BMI (kg/m2): 26.8 (06-11-20 @ 20:00)    PHYSICAL EXAM:  GEN: NAD, not acutely ill-appearing   EAR/NOSE/MOUTH/THROAT - MMM, NL ears and nose   EYES- JOSE A, conjunctiva and Sclera clear  NECK- supple, no JVD   RESPIRATORY-  CTAB, no r/r/w  CARDIOVASCULAR - SIS2, RRR, no m/r/g   GI -  soft, NT, BS +  EXTREMITIES- nontender, no LE edema  NEUROLOGY- A&Ox3, moving all extremities  PSYCHIATRY- calm, pleasant, cooperative       LABS:                        14.6   7.64  )-----------( 311      ( 15 Bacilio 2020 07:07 )             44.4       06-15    140  |  104  |  16  ----------------------------<  98  3.9   |  32  |  0.91    Ca    8.9      15 Bacilio 2020 07:07       eGFR if Non : 93 mL/min/1.73M2 (06-15-20 @ 07:07)  eGFR if : 108 mL/min/1.73M2 (06-15-20 @ 07:07)             06-13 Chol 105 mg/dL LDL 57 mg/dL HDL 37 mg/dL Trig 57 mg/dL                        RADIOLOGY & ADDITIONAL TESTS:    Care Discussed with Consultants/Other Providers:

## 2020-06-16 NOTE — PROGRESS NOTE ADULT - SUBJECTIVE AND OBJECTIVE BOX
HPI:  Pt is a 56 y/o with PMHx of poorly controlled HTN who presented to [facility] on 6/4/20 with left sided facial numbness and left sided body numbness upon waking. The numbness was associated with diplopia, nausea, and 1 episode of NBNB vomiting. Pt also noticed paresthesias in his LUE. MRI revealed an acute infarct in the right anisa. Pt was COVID negative upon admission. Pt's course also showed calcified right lung granulomas on CT chest. HPI:  Pt is a 54 y/o with PMHx of poorly controlled HTN who presented to [facility] on 6/4/20 with left sided facial numbness and left sided body numbness upon waking. The numbness was associated with diplopia, nausea, and 1 episode of NBNB vomiting. Pt also noticed paresthesias in his LUE. MRI revealed an acute infarct in the right anisa. Pt was COVID negative upon admission. Pt's course also showed calcified right lung granulomas on CT chest.    SUBJECTIVE / INTERVAL HPI: Patient seen and examined at bedside. He is doing well. He states that his left hand is feeling a little better, though still has tingling/numbness. He went to the bathroom yesterday, and has no complaints this morning.     REVIEW OF SYSTEMS:    CONSTITUTIONAL: No fevers or chills  EYES/ENT: No visual changes;  No vertigo or throat pain   NECK: No pain or stiffness  RESPIRATORY: No cough, wheezing, or shortness of breath  CARDIOVASCULAR: No chest pain or palpitations  GASTROINTESTINAL: No abdominal or epigastric pain. No nausea or vomiting. No diarrhea or constipation.   GENITOURINARY: No dysuria, frequency or hematuria  NEUROLOGICAL: + numbness and mild weakness of left hand  SKIN: No itching, rashes      VITAL SIGNS:  Vital Signs Last 24 Hrs  T(C): 36.6 (16 Jun 2020 08:08), Max: 36.6 (16 Jun 2020 08:08)  T(F): 97.8 (16 Jun 2020 08:08), Max: 97.8 (16 Jun 2020 08:08)  HR: 59 (16 Jun 2020 08:08) (50 - 67)  BP: 132/83 (16 Jun 2020 08:08) (119/76 - 132/83)  BP(mean): --  RR: 15 (16 Jun 2020 08:08) (15 - 15)  SpO2: 98% (16 Jun 2020 08:08) (98% - 99%)    PHYSICAL EXAM:    General: NAD, sitting in chair comfortably  HEENT: NC/AT; PERRL, anicteric sclera; MMM  Neck: supple  Cardiovascular: +S1/S2, RRR  Respiratory: CTA B/L; no wheezes, no crackles  Gastrointestinal: soft, NT/ND; normoactive bowel sounds  Extremities: warm, well perfused; no edema, clubbing or cyanosis  Neurological: AAOx3; decreased sensation left palm, Motor: LUE SA 5/5, EF 5/5, EE 5/5, WE 5/5,  4+/5    MEDICATIONS:  MEDICATIONS  (STANDING):  amLODIPine   Tablet 5 milliGRAM(s) Oral daily  aspirin enteric coated 81 milliGRAM(s) Oral daily  atorvastatin 40 milliGRAM(s) Oral at bedtime  clopidogrel Tablet 75 milliGRAM(s) Oral daily  enoxaparin Injectable 40 milliGRAM(s) SubCutaneous daily  hydrochlorothiazide 12.5 milliGRAM(s) Oral <User Schedule>  polyethylene glycol 3350 17 Gram(s) Oral daily  senna 2 Tablet(s) Oral at bedtime    MEDICATIONS  (PRN):  acetaminophen   Tablet .. 650 milliGRAM(s) Oral every 6 hours PRN Temp greater or equal to 38C (100.4F), Mild Pain (1 - 3)      ALLERGIES:  Allergies    No Known Allergies    Intolerances        LABS:                        14.6   7.64  )-----------( 311      ( 15 Bacilio 2020 07:07 )             44.4     06-15    140  |  104  |  16  ----------------------------<  98  3.9   |  32<H>  |  0.91    Ca    8.9      15 Bacilio 2020 07:07          CAPILLARY BLOOD GLUCOSE          RADIOLOGY & ADDITIONAL TESTS: Reviewed.

## 2020-06-17 PROCEDURE — 99232 SBSQ HOSP IP/OBS MODERATE 35: CPT

## 2020-06-17 RX ADMIN — POLYETHYLENE GLYCOL 3350 17 GRAM(S): 17 POWDER, FOR SOLUTION ORAL at 11:43

## 2020-06-17 RX ADMIN — SENNA PLUS 2 TABLET(S): 8.6 TABLET ORAL at 22:12

## 2020-06-17 RX ADMIN — CLOPIDOGREL BISULFATE 75 MILLIGRAM(S): 75 TABLET, FILM COATED ORAL at 11:43

## 2020-06-17 RX ADMIN — AMLODIPINE BESYLATE 5 MILLIGRAM(S): 2.5 TABLET ORAL at 05:54

## 2020-06-17 RX ADMIN — ENOXAPARIN SODIUM 40 MILLIGRAM(S): 100 INJECTION SUBCUTANEOUS at 11:43

## 2020-06-17 RX ADMIN — Medication 81 MILLIGRAM(S): at 11:43

## 2020-06-17 RX ADMIN — ATORVASTATIN CALCIUM 40 MILLIGRAM(S): 80 TABLET, FILM COATED ORAL at 22:12

## 2020-06-17 RX ADMIN — Medication 12.5 MILLIGRAM(S): at 11:43

## 2020-06-17 NOTE — PROGRESS NOTE ADULT - SUBJECTIVE AND OBJECTIVE BOX
HPI:  Pt is a 54 y/o with PMHx of poorly controlled HTN who presented to [facility] on 6/4/20 with left sided facial numbness and left sided body numbness upon waking. The numbness was associated with diplopia, nausea, and 1 episode of NBNB vomiting. Pt also noticed paresthesias in his LUE. MRI revealed an acute infarct in the right anisa. Pt was COVID negative upon admission. Pt's course also showed calcified right lung granulomas on CT chest. (11 Jun 2020 13:32)      PAST MEDICAL & SURGICAL HISTORY:  HTN (hypertension)      Subjective: Spoke with pt. via Mandarin telephone  #[id number]. No new complaints    REVIEW OF SYMPTOMS  CONSTITUTIONAL: No fevers or chills  EYES/ENT: No visual changes;  No vertigo or throat pain   NECK: No pain or stiffness  RESPIRATORY: No cough, wheezing, or shortness of breath  CARDIOVASCULAR: No chest pain or palpitations  GASTROINTESTINAL: No abdominal or epigastric pain. No nausea or vomiting. No diarrhea or constipation.   GENITOURINARY: No dysuria, frequency or hematuria  NEUROLOGICAL: + numbness and mild weakness of left hand  SKIN: No itching, rashes    VITALS  Vital Signs Last 24 Hrs  T(C): 36.4 (17 Jun 2020 07:18), Max: 36.4 (16 Jun 2020 21:00)  T(F): 97.5 (17 Jun 2020 07:18), Max: 97.5 (16 Jun 2020 21:00)  HR: 56 (17 Jun 2020 07:18) (56 - 69)  BP: 146/95 (17 Jun 2020 07:18) (128/83 - 146/95)  BP(mean): --  RR: 16 (17 Jun 2020 07:18) (15 - 16)  SpO2: 99% (17 Jun 2020 07:18) (99% - 99%)      PHYSICAL EXAM  General: NAD, sitting in chair comfortably  HEENT: NC/AT; PERRL, anicteric sclera; MMM  Neck: supple  Cardiovascular: +S1/S2, RRR  Respiratory: CTA B/L; no wheezes, no crackles  Gastrointestinal: soft, NT/ND; normoactive bowel sounds  Extremities: warm, well perfused; no edema, clubbing or cyanosis  Neurological: AAOx3; decreased sensation left hand, Motor: LUE SA 5/5, EF 5/5, EE 5/5, WE 5/5,  4+/5    RECENT LABS                  RADIOLOGY/OTHER RESULTS      MEDICATIONS  (STANDING):  amLODIPine   Tablet 5 milliGRAM(s) Oral daily  aspirin enteric coated 81 milliGRAM(s) Oral daily  atorvastatin 40 milliGRAM(s) Oral at bedtime  clopidogrel Tablet 75 milliGRAM(s) Oral daily  enoxaparin Injectable 40 milliGRAM(s) SubCutaneous daily  hydrochlorothiazide 12.5 milliGRAM(s) Oral <User Schedule>  polyethylene glycol 3350 17 Gram(s) Oral daily  senna 2 Tablet(s) Oral at bedtime    MEDICATIONS  (PRN):  acetaminophen   Tablet .. 650 milliGRAM(s) Oral every 6 hours PRN Temp greater or equal to 38C (100.4F), Mild Pain (1 - 3)

## 2020-06-17 NOTE — PROGRESS NOTE ADULT - SUBJECTIVE AND OBJECTIVE BOX
HPI  Pt is a 56yo M admitted to acute rehab s/p CVA   Pt was seen and examined in chair and spoke in mandarin chinese. Pt states he is doing well, feeling a little stronger. Hemodynamically stable.     Vital Signs Last 24 Hrs  T(C): 36.4 (17 Jun 2020 07:18), Max: 36.4 (16 Jun 2020 21:00)  T(F): 97.5 (17 Jun 2020 07:18), Max: 97.5 (16 Jun 2020 21:00)  HR: 56 (17 Jun 2020 07:18) (56 - 69)  BP: 146/95 (17 Jun 2020 07:18) (128/83 - 146/95)  BP(mean): --  RR: 16 (17 Jun 2020 07:18) (15 - 16)  SpO2: 99% (17 Jun 2020 07:18) (99% - 99%)    I&O's Summary    16 Jun 2020 07:01  -  17 Jun 2020 07:00  --------------------------------------------------------  IN: 480 mL / OUT: 0 mL / NET: 480 mL        CAPILLARY BLOOD GLUCOSE          PHYSICAL EXAM:    Constitutional: NAD, awake and alert, well-developed  HEENT: PERR, EOMI, Normal Hearing, MMM  Neck: Soft and supple, No LAD, No JVD  Respiratory: Breath sounds are clear bilaterally, No wheezing, rales or rhonchi  Cardiovascular: S1 and S2, regular rate and rhythm, no Murmurs, gallops or rubs  Gastrointestinal: Bowel Sounds present, soft, nontender, nondistended, no guarding, no rebound  Extremities: No peripheral edema  Vascular: 2+ peripheral pulses  Neurological: A/O x 3,  minimal blurry vision improving   Musculoskeletal: 5/5 strength b/l upper and lower extremities  Skin: No rashes    MEDICATIONS:  MEDICATIONS  (STANDING):  amLODIPine   Tablet 5 milliGRAM(s) Oral daily  aspirin enteric coated 81 milliGRAM(s) Oral daily  atorvastatin 40 milliGRAM(s) Oral at bedtime  clopidogrel Tablet 75 milliGRAM(s) Oral daily  enoxaparin Injectable 40 milliGRAM(s) SubCutaneous daily  hydrochlorothiazide 12.5 milliGRAM(s) Oral <User Schedule>  polyethylene glycol 3350 17 Gram(s) Oral daily  senna 2 Tablet(s) Oral at bedtime      LABS: All Labs Reviewed:                Blood Culture:     RADIOLOGY/EKG:    DVT PPX:    ADVANCED DIRECTIVE:    DISPOSITION:

## 2020-06-18 LAB
ANION GAP SERPL CALC-SCNC: 10 MMOL/L — SIGNIFICANT CHANGE UP (ref 5–17)
BUN SERPL-MCNC: 16 MG/DL — SIGNIFICANT CHANGE UP (ref 7–23)
CALCIUM SERPL-MCNC: 8.7 MG/DL — SIGNIFICANT CHANGE UP (ref 8.4–10.5)
CHLORIDE SERPL-SCNC: 104 MMOL/L — SIGNIFICANT CHANGE UP (ref 96–108)
CO2 SERPL-SCNC: 27 MMOL/L — SIGNIFICANT CHANGE UP (ref 22–31)
CREAT SERPL-MCNC: 0.74 MG/DL — SIGNIFICANT CHANGE UP (ref 0.5–1.3)
GLUCOSE SERPL-MCNC: 98 MG/DL — SIGNIFICANT CHANGE UP (ref 70–99)
HCT VFR BLD CALC: 42.3 % — SIGNIFICANT CHANGE UP (ref 39–50)
HGB BLD-MCNC: 14 G/DL — SIGNIFICANT CHANGE UP (ref 13–17)
MCHC RBC-ENTMCNC: 29.6 PG — SIGNIFICANT CHANGE UP (ref 27–34)
MCHC RBC-ENTMCNC: 33.1 GM/DL — SIGNIFICANT CHANGE UP (ref 32–36)
MCV RBC AUTO: 89.4 FL — SIGNIFICANT CHANGE UP (ref 80–100)
NRBC # BLD: 0 /100 WBCS — SIGNIFICANT CHANGE UP (ref 0–0)
PLATELET # BLD AUTO: 325 K/UL — SIGNIFICANT CHANGE UP (ref 150–400)
POTASSIUM SERPL-MCNC: 3.5 MMOL/L — SIGNIFICANT CHANGE UP (ref 3.5–5.3)
POTASSIUM SERPL-SCNC: 3.5 MMOL/L — SIGNIFICANT CHANGE UP (ref 3.5–5.3)
RBC # BLD: 4.73 M/UL — SIGNIFICANT CHANGE UP (ref 4.2–5.8)
RBC # FLD: 12.7 % — SIGNIFICANT CHANGE UP (ref 10.3–14.5)
SODIUM SERPL-SCNC: 141 MMOL/L — SIGNIFICANT CHANGE UP (ref 135–145)
WBC # BLD: 7.19 K/UL — SIGNIFICANT CHANGE UP (ref 3.8–10.5)
WBC # FLD AUTO: 7.19 K/UL — SIGNIFICANT CHANGE UP (ref 3.8–10.5)

## 2020-06-18 PROCEDURE — 99232 SBSQ HOSP IP/OBS MODERATE 35: CPT

## 2020-06-18 RX ADMIN — POLYETHYLENE GLYCOL 3350 17 GRAM(S): 17 POWDER, FOR SOLUTION ORAL at 11:51

## 2020-06-18 RX ADMIN — ATORVASTATIN CALCIUM 40 MILLIGRAM(S): 80 TABLET, FILM COATED ORAL at 22:01

## 2020-06-18 RX ADMIN — CLOPIDOGREL BISULFATE 75 MILLIGRAM(S): 75 TABLET, FILM COATED ORAL at 11:51

## 2020-06-18 RX ADMIN — Medication 81 MILLIGRAM(S): at 11:51

## 2020-06-18 RX ADMIN — SENNA PLUS 2 TABLET(S): 8.6 TABLET ORAL at 22:01

## 2020-06-18 RX ADMIN — ENOXAPARIN SODIUM 40 MILLIGRAM(S): 100 INJECTION SUBCUTANEOUS at 11:51

## 2020-06-18 RX ADMIN — Medication 12.5 MILLIGRAM(S): at 11:51

## 2020-06-18 NOTE — PROGRESS NOTE ADULT - SUBJECTIVE AND OBJECTIVE BOX
HPI  Pt is a 56yo M admitted to acute rehab s/p CVA   Pt was seen and examined in chair and spoke in mandarin chinese. Pt states he is doing well, feeling a little stronger. Mild dizziness not vertigo noted this morning, admit didnt drink water today.  Hemodynamically stable.     Vital Signs Last 24 Hrs  T(C): 36.6 (17 Jun 2020 22:16), Max: 36.6 (17 Jun 2020 22:16)  T(F): 97.8 (17 Jun 2020 22:16), Max: 97.8 (17 Jun 2020 22:16)  HR: 54 (18 Jun 2020 04:52) (54 - 78)  BP: 118/76 (18 Jun 2020 04:52) (118/76 - 145/84)  BP(mean): --  RR: 16 (17 Jun 2020 22:16) (16 - 16)  SpO2: 97% (17 Jun 2020 22:16) (97% - 97%)    I&O's Summary      CAPILLARY BLOOD GLUCOSE          PHYSICAL EXAM:    Constitutional: NAD, awake and alert, well-developed  HEENT: PERR, EOMI, Normal Hearing, MMM  Neck: Soft and supple, No LAD, No JVD  Respiratory: Breath sounds are clear bilaterally, No wheezing, rales or rhonchi  Cardiovascular: S1 and S2, regular rate and rhythm, no Murmurs, gallops or rubs  Gastrointestinal: Bowel Sounds present, soft, nontender, nondistended, no guarding, no rebound  Extremities: No peripheral edema  Vascular: 2+ peripheral pulses  Neurological: A/O x 3,  minimal blurry vision improving   Musculoskeletal: 5/5 strength b/l upper and lower extremities  Skin: No rashes    MEDICATIONS:  MEDICATIONS  (STANDING):  amLODIPine   Tablet 5 milliGRAM(s) Oral daily  aspirin enteric coated 81 milliGRAM(s) Oral daily  atorvastatin 40 milliGRAM(s) Oral at bedtime  clopidogrel Tablet 75 milliGRAM(s) Oral daily  enoxaparin Injectable 40 milliGRAM(s) SubCutaneous daily  hydrochlorothiazide 12.5 milliGRAM(s) Oral <User Schedule>  polyethylene glycol 3350 17 Gram(s) Oral daily  senna 2 Tablet(s) Oral at bedtime      LABS: All Labs Reviewed:                        14.0   7.19  )-----------( 325      ( 18 Jun 2020 05:40 )             42.3     06-18    141  |  104  |  16  ----------------------------<  98  3.5   |  27  |  0.74    Ca    8.7      18 Jun 2020 05:40            Blood Culture:     RADIOLOGY/EKG:    DVT PPX:    ADVANCED DIRECTIVE:    DISPOSITION:

## 2020-06-18 NOTE — PROGRESS NOTE ADULT - SUBJECTIVE AND OBJECTIVE BOX
HPI:  Pt is a 56 y/o with PMHx of poorly controlled HTN who presented to [facility] on 6/4/20 with left sided facial numbness and left sided body numbness upon waking. The numbness was associated with diplopia, nausea, and 1 episode of NBNB vomiting. Pt also noticed paresthesias in his LUE. MRI revealed an acute infarct in the right anisa. Pt was COVID negative upon admission. Pt's course also showed calcified right lung granulomas on CT chest. HPI:  Pt is a 56 y/o with PMHx of poorly controlled HTN who presented to [facility] on 6/4/20 with left sided facial numbness and left sided body numbness upon waking. The numbness was associated with diplopia, nausea, and 1 episode of NBNB vomiting. Pt also noticed paresthesias in his LUE. MRI revealed an acute infarct in the right anisa. Pt was COVID negative upon admission. Pt's course also showed calcified right lung granulomas on CT chest.     SUBJECTIVE / INTERVAL HPI: Patient seen and examined at bedside. Mandarin  #[id number] used. Pt is doing well, he has a mild headache and was a little lightheaded this morning. Pt says that his left hand numbness is improved from yesterday. Overall he is doing very well.     REVIEW OF SYSTEMS:    CONSTITUTIONAL: No weakness, fevers or chills  EYES/ENT: No visual changes;  No vertigo or throat pain   NECK: No pain or stiffness  RESPIRATORY: No cough, wheezing, or shortness of breath  CARDIOVASCULAR: No chest pain or palpitations  GASTROINTESTINAL: No abdominal or epigastric pain. No nausea or vomiting. No diarrhea or constipation.   GENITOURINARY: No dysuria, frequency or hematuria  NEUROLOGICAL: + left hand numbness improving, + left hand weakness improving  SKIN: No itching, rashes      VITAL SIGNS:  Vital Signs Last 24 Hrs  T(C): 36.6 (17 Jun 2020 22:16), Max: 36.6 (17 Jun 2020 22:16)  T(F): 97.8 (17 Jun 2020 22:16), Max: 97.8 (17 Jun 2020 22:16)  HR: 54 (18 Jun 2020 04:52) (54 - 78)  BP: 118/76 (18 Jun 2020 04:52) (118/76 - 145/84)  BP(mean): --  RR: 16 (17 Jun 2020 22:16) (16 - 16)  SpO2: 97% (17 Jun 2020 22:16) (97% - 97%)    PHYSICAL EXAM:    General: NAD, comfortably sitting in wheelchair  HEENT: NC/AT; PERRL, anicteric sclera; MMM  Neck: supple  Cardiovascular: +S1/S2, RRR  Respiratory: CTA B/L; no W/R/R, no crackles  Gastrointestinal: soft, NT/ND; bowel sounds intact x4  Extremities: warm, well perfused; no edema, clubbing or cyanosis  Neurological: AAOx3; no new deficits, left hand sensation improving, motor exam stable    MEDICATIONS:  MEDICATIONS  (STANDING):  amLODIPine   Tablet 5 milliGRAM(s) Oral daily  aspirin enteric coated 81 milliGRAM(s) Oral daily  atorvastatin 40 milliGRAM(s) Oral at bedtime  clopidogrel Tablet 75 milliGRAM(s) Oral daily  enoxaparin Injectable 40 milliGRAM(s) SubCutaneous daily  hydrochlorothiazide 12.5 milliGRAM(s) Oral <User Schedule>  polyethylene glycol 3350 17 Gram(s) Oral daily  senna 2 Tablet(s) Oral at bedtime    MEDICATIONS  (PRN):  acetaminophen   Tablet .. 650 milliGRAM(s) Oral every 6 hours PRN Temp greater or equal to 38C (100.4F), Mild Pain (1 - 3)      ALLERGIES:  Allergies    No Known Allergies    Intolerances        LABS:                        14.0   7.19  )-----------( 325      ( 18 Jun 2020 05:40 )             42.3     06-18    141  |  104  |  16  ----------------------------<  98  3.5   |  27  |  0.74    Ca    8.7      18 Jun 2020 05:40          CAPILLARY BLOOD GLUCOSE          RADIOLOGY & ADDITIONAL TESTS: Reviewed.

## 2020-06-19 ENCOUNTER — TRANSCRIPTION ENCOUNTER (OUTPATIENT)
Age: 55
End: 2020-06-19

## 2020-06-19 LAB — SARS-COV-2 RNA SPEC QL NAA+PROBE: SIGNIFICANT CHANGE UP

## 2020-06-19 PROCEDURE — 99232 SBSQ HOSP IP/OBS MODERATE 35: CPT

## 2020-06-19 RX ORDER — ATORVASTATIN CALCIUM 80 MG/1
1 TABLET, FILM COATED ORAL
Qty: 30 | Refills: 0
Start: 2020-06-19 | End: 2020-07-18

## 2020-06-19 RX ORDER — AMLODIPINE BESYLATE 2.5 MG/1
1 TABLET ORAL
Qty: 30 | Refills: 0
Start: 2020-06-19 | End: 2020-07-18

## 2020-06-19 RX ORDER — CLOPIDOGREL BISULFATE 75 MG/1
1 TABLET, FILM COATED ORAL
Qty: 30 | Refills: 0
Start: 2020-06-19 | End: 2020-07-18

## 2020-06-19 RX ORDER — ASPIRIN/CALCIUM CARB/MAGNESIUM 324 MG
1 TABLET ORAL
Qty: 30 | Refills: 0
Start: 2020-06-19 | End: 2020-07-18

## 2020-06-19 RX ADMIN — ENOXAPARIN SODIUM 40 MILLIGRAM(S): 100 INJECTION SUBCUTANEOUS at 11:45

## 2020-06-19 RX ADMIN — POLYETHYLENE GLYCOL 3350 17 GRAM(S): 17 POWDER, FOR SOLUTION ORAL at 11:45

## 2020-06-19 RX ADMIN — ATORVASTATIN CALCIUM 40 MILLIGRAM(S): 80 TABLET, FILM COATED ORAL at 20:24

## 2020-06-19 RX ADMIN — Medication 81 MILLIGRAM(S): at 11:45

## 2020-06-19 RX ADMIN — SENNA PLUS 2 TABLET(S): 8.6 TABLET ORAL at 20:23

## 2020-06-19 RX ADMIN — CLOPIDOGREL BISULFATE 75 MILLIGRAM(S): 75 TABLET, FILM COATED ORAL at 11:45

## 2020-06-19 RX ADMIN — AMLODIPINE BESYLATE 5 MILLIGRAM(S): 2.5 TABLET ORAL at 05:23

## 2020-06-19 RX ADMIN — Medication 12.5 MILLIGRAM(S): at 11:45

## 2020-06-19 NOTE — DISCHARGE NOTE PROVIDER - CARE PROVIDERS DIRECT ADDRESSES
,DirectAddress_Unknown,DirectAddress_Unknown ,DirectAddress_Unknown,[email or web address]

## 2020-06-19 NOTE — DISCHARGE NOTE PROVIDER - CARE PROVIDER_API CALL
Kellie Theodore  Phone: [phone]  Fax: (   )    -  Follow Up Time:     Ashanti Barker  [facility] [address] [place] [address]  Phone: [phone]  Fax: [phone]  Follow Up Time: Kellie Theodore  Phone: [phone]  Fax: (   )    -  Follow Up Time:     Riaz Eller  PHYSICAL/REHAB MEDICINE  [address] [place] [address]  Phone: [phone]  Fax: [phone]  Follow Up Time:

## 2020-06-19 NOTE — DISCHARGE NOTE PROVIDER - NSDCMRMEDTOKEN_GEN_ALL_CORE_FT
amLODIPine 5 mg oral tablet: 1 tab(s) orally once a day  aspirin 81 mg oral delayed release tablet: 1 tab(s) orally once a day  atorvastatin 40 mg oral tablet: 1 tab(s) orally once a day (at bedtime)  clopidogrel 75 mg oral tablet: 1 tab(s) orally once a day  hydroCHLOROthiazide 12.5 mg oral capsule: 1 cap(s) orally once a day   Occupational therapy: OT BI[facility] x 6 weeks  Eval and treat, ADLs, IADLs, Coordination, HEP  Physical Therapy: Physical Therapy BI[facility] x 6 weeks  Eval and Treat, Balance, community ambulation, Stairs, endurance, vestibular exercises. HEP

## 2020-06-19 NOTE — CHART NOTE - NSCHARTNOTEFT_GEN_A_CORE
NUTRITION FOLLOW UP    SOURCE: Patient [X)   Family [ ]    Medical Record (X)    DIET: Regular    Pt tolerating diet and eating well- states "everything is good". Consuming % of meals. Pt enjoys food in house per hospitalist report. No nutrition related concerns. Recommend change diet to DASH/TLC.     CURRENT WEIGHT:   (6/12) 171.9lbs   (6/18) 174.8lbs     PERTINENT MEDS:   Pertinent Medications: MEDICATIONS  (STANDING):  amLODIPine   Tablet 5 milliGRAM(s) Oral daily  aspirin enteric coated 81 milliGRAM(s) Oral daily  atorvastatin 40 milliGRAM(s) Oral at bedtime  clopidogrel Tablet 75 milliGRAM(s) Oral daily  enoxaparin Injectable 40 milliGRAM(s) SubCutaneous daily  hydrochlorothiazide 12.5 milliGRAM(s) Oral <User Schedule>  polyethylene glycol 3350 17 Gram(s) Oral daily  senna 2 Tablet(s) Oral at bedtime    MEDICATIONS  (PRN):  acetaminophen   Tablet .. 650 milliGRAM(s) Oral every 6 hours PRN Temp greater or equal to 38C (100.4F), Mild Pain (1 - 3)      PERTINENT LABS:  06-18 Na141 mmol/L Glu 98 mg/dL K+ 3.5 mmol/L Cr  0.74 mg/dL BUN 16 mg/dL 06-13 Chol 105 mg/dL LDL 57 mg/dL HDL 37 mg/dL<L> Trig 57 mg/dL      SKIN:  no pressure ulcers  EDEMA: no edema   LAST BM: 6/16     ESTIMATED NEEDS:   [X] no change since previous assessment  [ ] recalculated:     PREVIOUS NUTRITION DIAGNOSIS:  N/A    NUTRITION DIAGNOSIS is :  (X)  Ongoing       NEW NUTRITION DIAGNOSIS: N/A    NUTRITION RECOMMENDATIONS:   1. Recommend change diet to DASH/TLC   2. Daily weights   3. Bowel regimen per medical team     MONITORING AND EVALUATION:   1. Tolerance to diet prescription   2. PO intake  3. Weights  4. Labs  5. Follow Up per protocol     RD to remain available   Indu Funes RDN   Pager #[phone]

## 2020-06-19 NOTE — PROGRESS NOTE ADULT - ASSESSMENT
55 year-old male with PMHx of HTN who was found to have an acute ischemic infarct of his right anisa. He is currently admitted to [facility] for acute rehabilitation of his functional deficits following his CVA.     1. Acute CVA (right sided anisa infarct) now with functional/ADL deficits  - c/w comprehensive rehab program-PT/OT/speech  - c/w ASA, plavix, Statin  - c/w BP control (see below)    2. HTN  / HLD  - stable   - c/w norvasc, HCTZ, statin  - monitor VS     3. Bowel regimen- on miralax and senna    4. DVT PPx - on lovenox - c/w same     5. Pain- controlled, c/w mgmt as per primary team, on tylenol prn    Rest as per primary team.
Mr. Acosta is a pleasant 55 year-old male with hx of HTN that was found to have an acute ischemic infarct of his right anisa. He is currently admitted to [facility] for acute rehabilitation of his functional deficits following his CVA.       Problem  1.	CVA  2.	Hypertension  3.	Hyperlipidemia   4.	Constipation  5.	Headaches    Plan  1.	ASA 81mg qd, plavix 75mg qd, atorvastatin 40mg qhs, meclizine prn for dizzines  2.	amlodipine 5mg qd with hold parameters. given persistent elevation will start HCTZ 12.5 MG at 12PM with hold parameter of 120.   3.	statin as above. given lipid profile and other parameters his 10 year risk of a cardiovascular event is 8.4% which indicates he should be on moderate intensity statin therapy. Given this, his current dosage is acceptable   4.	senna qd  5.	Tylenol prn should be added to his pain regimen. If significant worsening would consider repeat CTH.     Diet: per primary  Pain Control: per primary  DVT ppx: lovenox  Case discussed with primary team  If a clinical question should arise regarding this patient, please do not hesitate to contact me at [phone] until 7pm on 6/14/2020. If after 7pm, contact on-call hospitalist
Mr. Acosta is a pleasant 55 year-old male with hx of HTN that was found to have an acute ischemic infarct of his right anisa. He is currently admitted to [facility] for acute rehabilitation of his functional deficits following his CVA.       Problem  1.	CVA  2.	Hypertension  3.	Hyperlipidemia   4.	Constipation    Plan  1.	ASA 81mg qd, plavix 75mg qd, atorvastatin 40mg qhs, meclizine prn for dizzines  2.	amlodipine 5mg qd with hold parameters. given persistent elevation will start HCTZ 12.5 MG at 12PM with hold parameter of 120.   3.	statin as above. lipid profile pending. Will utilize to determine 10 year cardiovascular risk and target LDL following stroke  4.	senna qd    Diet: per primary  Pain Control: per primary  DVT ppx: lovenox  Case discussed with primary team  If a clinical question should arise regarding this patient, please do not hesitate to contact me at [phone] until 7pm on 6/13/2020. If after 7pm, contact on-call hospitalist
Pt is a 54yo M admitted to acute rehab s/p CVA     *CVA  Cont acute rehab  PT/OT/SLP   Cont asa, plavix, lipitor     *HTN   bp controlled  cont Norvasc 5, HCTZ 12.5     *Constipation   Senna, Miralax    *DVT ppx   Lovenox
Pt is a 54yo M admitted to acute rehab s/p CVA     *CVA  Cont acute rehab  PT/OT/SLP   Cont asa, plavix, lipitor     *HTN   bp controlled  cont Norvasc 5, HCTZ 12.5     *Constipation   Senna, Miralax    *DVT ppx   Lovenox
Pt is a 56 y/o with PMHx of poorly controlled HTN who presented to [facility]/[place] on 6/4/20 with left sided facial numbness and left sided body numbness upon waking. Pt was found to have an acute infarct of his right anisa on MRI now with vestibular disorder, sensory impairment, gait and ADL impairment and impulsivity.     #CVA  - right sided anisa infarct  - Aspirin  - Plavix  - Lipitor  - PT/OT/SLP  - BP control    #HTN  - Amlodipine 5mg daily  - Hydrochlorothiazide 12.5mg daily  - Hospitalist follow up    #Pain  - Tylenol PRN    #GI  - Senna, Miralax    #Diet  - Regular    #DVT ppx  - Lovenox
Pt is a 56 y/o with PMHx of poorly controlled HTN who presented to [facility]/[place] on 6/4/20 with left sided facial numbness and left sided body numbness upon waking. Pt was found to have an acute infarct of his right anisa on MRI now with vestibular disorder, sensory impairment, gait and ADL impairment and impulsivity.     #CVA  - right sided anisa infarct  - Aspirin  - Plavix  - Lipitor  - PT/OT/SLP  - BP control    #HTN  - Amlodipine 5mg daily  - Hydrochlorothiazide 12.5mg daily  - Hospitalist follow up    #Pain  - Tylenol PRN    #GI  - Senna, Miralax    #Diet  - Regular    #DVT ppx  - Lovenox    #Discharge Planning  - Pt discussed in team meeting on 6/16/20. In OT, he is set up/supervision for eating, grooming, and dressing, and is min A with toilet and shower transfers. In PT, he is supervision for transfers, walking 100' with cane with close supervision, and walking up and down 12 steps with CG. In SLP, he is doing very well, working on higher level cognitive reasoning activities. Tentative discharge date set for 6/20 with PT/OT, possibly outpatient.
Pt is a 56yo M admitted to acute rehab s/p CVA     *CVA  Cont acute rehab  PT/OT/SLP   Cont asa, plavix, lipitor     *HTN   bp controlled  cont Norvasc 5, HCTZ 12.5     *Constipation   Senna, Miralax    *DVT ppx   Lovenox
Pt is a 54 y/o with PMHx of poorly controlled HTN who presented to [facility]/[place] on 6/4/20 with left sided facial numbness and left sided body numbness upon waking. Pt was found to have an acute infarct of his right anisa on MRI now with vestibular disorder, sensory impairment, gait and ADL impairment and impulsivity.     #CVA  - right sided anisa infarct  - Aspirin  - Plavix  - Lipitor  - PT/OT/SLP  - BP control    #HTN  - Amlodipine 5mg daily  - Hydrochlorothiazide 12.5mg daily  - Hospitalist follow up    #Pain  - Tylenol PRN    #GI  - Senna, Miralax    #Diet  - Regular    #DVT ppx  - Lovenox    #Discharge Planning  - Pt discussed in team meeting on 6/16/20. In OT, he is set up/supervision for eating, grooming, and dressing, and is min A with toilet and shower transfers. In PT, he is supervision for transfers, walking 100' with cane with close supervision, and walking up and down 12 steps with CG. In SLP, he is doing very well, working on higher level cognitive reasoning activities. Tentative discharge date set for 6/20 with PT/OT, possibly outpatient.
Pt is a 54 y/o with PMHx of poorly controlled HTN who presented to [facility]/[place] on 6/4/20 with left sided facial numbness and left sided body numbness upon waking. Pt was found to have an acute infarct of his right anisa on MRI now with vestibular disorder, sensory impairment, gait and ADL impairment and impulsivity.     #CVA  - right sided anisa infarct  - Aspirin  - Plavix  - Lipitor  - PT/OT/SLP  - BP control    #HTN  - Amlodipine 5mg daily  - Hydrochlorothiazide 12.5mg daily  - Hospitalist follow up    #Pain  - Tylenol PRN    #GI  - Senna, Miralax    #Diet  - Regular, DASH/TLC    #DVT ppx  - Lovenox    #Discharge Planning  - Pt discussed in team meeting on 6/16/20. In OT, he is set up/supervision for eating, grooming, and dressing, and is min A with toilet and shower transfers. In PT, he is supervision for transfers, walking 100' with cane with close supervision, and walking up and down 12 steps with CG. In SLP, he is doing very well, working on higher level cognitive reasoning activities. Tentative discharge date set for 6/20 with PT/OT, possibly outpatient.
Pt is a 54 y/o with PMHx of poorly controlled HTN who presented to [facility]/[place] on 6/4/20 with left sided facial numbness and left sided body numbness upon waking. Pt was found to have an acute infarct of his right anisa on MRI now with vestibular disorder, sensory impairment, gait and ADL impairment and impulsivity.     #CVA  - right sided anisa infarct  - Aspirin  - Plavix  - Lipitor  - PT/OT/SLP  - BP control    #HTN  - Amlodipine 5mg daily  - Hydrochlorothiazide 12.5mg daily  - Hospitalist follow up    #Pain  - Tylenol PRN    #GI  - Senna, Miralax    #Diet  - Regular    #DVT ppx  - Lovenox    #Discharge Planning  - Pt discussed in team meeting on 6/16/20. In OT, he is set up/supervision for eating, grooming, and dressing, and is min A with toilet and shower transfers. In PT, he is supervision for transfers, walking 100' with cane with close supervision, and walking up and down 12 steps with CG. In SLP, he is doing very well, working on higher level cognitive reasoning activities. Tentative discharge date set for 6/20 with PT/OT, possibly outpatient.

## 2020-06-19 NOTE — DISCHARGE NOTE PROVIDER - HOSPITAL COURSE
Pt is a 54 y/o with PMHx of poorly controlled HTN who presented to [facility]/[place] on 6/4/20 with left sided facial numbness and left sided body numbness upon waking. The numbness was associated with diplopia, nausea, and 1 episode of NBNB vomiting. Pt also noticed paresthesias in his LUE. MRI revealed an acute infarct in the right anisa. Pt was COVID negative upon admission. Pt's course also showed calcified right lung granulomas on CT chest.         Pt did very well with therapy while at [place]. His symptoms improved, as his left hand numbness and weakness became better during his stay. With OT, he was a set up/supervision for eating, grooming, and dressing. With PT, he was supervision with transfers, ambulating at least 100' with close supervision, and climbing 12 steps with contact guard. Pt will go home with outpatient therapy and he will follow up with neurology. Pt is a 54 y/o with PMHx of poorly controlled HTN who presented to [facility]/[place] on 6/4/20 with left sided facial numbness and left sided body numbness upon waking. The numbness was associated with diplopia, nausea, and 1 episode of NBNB vomiting. Pt also noticed paresthesias in his LUE. MRI revealed an acute infarct in the right anisa. Pt was COVID negative upon admission. Pt's course also showed calcified right lung granulomas on CT chest.         Pt did very well with therapy while at [place]. His symptoms improved, as his left hand numbness and weakness became better during his stay. With OT, he was a set up/supervision for eating, grooming, and dressing. With PT, he is mod I with transfers, ambulating at least 100', and  supervision with climbing 12 steps. Pt will go home with outpatient therapy and he will follow up with neurology.

## 2020-06-19 NOTE — DISCHARGE NOTE PROVIDER - PROVIDER TOKENS
FREE:[LAST:[Ewelina],FIRST:[Kellie],PHONE:[phone]],FAX:[(   )    -]],PROVIDER:[TOKEN:[[id number]]] FREE:[LAST:[Ewelina],FIRST:[Kellie],PHONE:[phone]],FAX:[(   )    -]],PROVIDER:[TOKEN:[[id number]]]

## 2020-06-19 NOTE — DISCHARGE NOTE NURSING/CASE MANAGEMENT/SOCIAL WORK - NSDCCRNUMBER_GEN_ALL_CORE_FT
See list provided to schedule appt via [facility] ([place]) or [facility] ([place])

## 2020-06-19 NOTE — PROGRESS NOTE ADULT - SUBJECTIVE AND OBJECTIVE BOX
HPI  Pt is a 56yo M admitted to acute rehab s/p CVA   Pt was seen and examined in chair and spoke in mandarin chinese. Pt states minimal dizziness when head movement. Hemodynamically stable. Excited to go home today.     Vital Signs Last 24 Hrs  T(C): 36.4 (19 Jun 2020 07:52), Max: 36.6 (18 Jun 2020 20:07)  T(F): 97.6 (19 Jun 2020 07:52), Max: 97.8 (18 Jun 2020 20:07)  HR: 64 (19 Jun 2020 07:52) (56 - 64)  BP: 135/88 (19 Jun 2020 07:52) (134/84 - 138/87)  BP(mean): --  RR: 15 (19 Jun 2020 07:52) (15 - 16)  SpO2: 100% (19 Jun 2020 07:52) (99% - 100%)    I&O's Summary    19 Jun 2020 07:01  -  19 Jun 2020 11:58  --------------------------------------------------------  IN: 360 mL / OUT: 0 mL / NET: 360 mL        CAPILLARY BLOOD GLUCOSE          PHYSICAL EXAM:    Constitutional: NAD, awake and alert, well-developed  HEENT: PERR, EOMI, Normal Hearing, MMM  Neck: Soft and supple, No LAD, No JVD  Respiratory: Breath sounds are clear bilaterally, No wheezing, rales or rhonchi  Cardiovascular: S1 and S2, regular rate and rhythm, no Murmurs, gallops or rubs  Gastrointestinal: Bowel Sounds present, soft, nontender, nondistended, no guarding, no rebound  Extremities: No peripheral edema  Vascular: 2+ peripheral pulses  Neurological: A/O x 3,  minimal blurry vision improving   Musculoskeletal: 5/5 strength b/l upper and lower extremities  Skin: No rashes    MEDICATIONS:  MEDICATIONS  (STANDING):  amLODIPine   Tablet 5 milliGRAM(s) Oral daily  aspirin enteric coated 81 milliGRAM(s) Oral daily  atorvastatin 40 milliGRAM(s) Oral at bedtime  clopidogrel Tablet 75 milliGRAM(s) Oral daily  enoxaparin Injectable 40 milliGRAM(s) SubCutaneous daily  hydrochlorothiazide 12.5 milliGRAM(s) Oral <User Schedule>  polyethylene glycol 3350 17 Gram(s) Oral daily  senna 2 Tablet(s) Oral at bedtime      LABS: All Labs Reviewed:                        14.0   7.19  )-----------( 325      ( 18 Jun 2020 05:40 )             42.3     06-18    141  |  104  |  16  ----------------------------<  98  3.5   |  27  |  0.74    Ca    8.7      18 Jun 2020 05:40            Blood Culture:     RADIOLOGY/EKG:    DVT PPX:    ADVANCED DIRECTIVE:    DISPOSITION:

## 2020-06-19 NOTE — DISCHARGE NOTE NURSING/CASE MANAGEMENT/SOCIAL WORK - NSDCPEPTSTRK_GEN_ALL_CORE
Need for follow up after discharge/Prescribed medications/Call 911 for stroke/Stroke warning signs and symptoms/Signs and symptoms of stroke/Risk factors for stroke/Stroke education booklet/Stroke support groups for patients, families, and friends

## 2020-06-19 NOTE — PROGRESS NOTE ADULT - SUBJECTIVE AND OBJECTIVE BOX
HPI:  Pt is a 54 y/o with PMHx of poorly controlled HTN who presented to [facility]/[place] on 6/4/20 with left sided facial numbness and left sided body numbness upon waking. The numbness was associated with diplopia, nausea, and 1 episode of NBNB vomiting. Pt also noticed paresthesias in his LUE. MRI revealed an acute infarct in the right anisa. Pt was COVID negative upon admission. Pt's course also showed calcified right lung granulomas on CT chest. HPI:  Pt is a 54 y/o with PMHx of poorly controlled HTN who presented to [facility]/[place] on 6/4/20 with left sided facial numbness and left sided body numbness upon waking. The numbness was associated with diplopia, nausea, and 1 episode of NBNB vomiting. Pt also noticed paresthesias in his LUE. MRI revealed an acute infarct in the right anisa. Pt was COVID negative upon admission. Pt's course also showed calcified right lung granulomas on CT chest.     SUBJECTIVE / INTERVAL HPI: Patient seen and examined at bedside. Dr. López hospitalist present to translate. Pt is excited to be leaving tomorrow. He has no complaints and slept well overnight.     REVIEW OF SYSTEMS:    CONSTITUTIONAL: No fevers or chills  EYES/ENT: No visual changes;  No vertigo or throat pain   NECK: No pain or stiffness  RESPIRATORY: No cough, wheezing, or shortness of breath  CARDIOVASCULAR: No chest pain or palpitations  GASTROINTESTINAL: No abdominal or epigastric pain. No nausea or vomiting. No diarrhea or constipation.   GENITOURINARY: No dysuria, frequency or hematuria  NEUROLOGICAL: very mild left hand numbness weakness  SKIN: No itching, rashes      VITAL SIGNS:  Vital Signs Last 24 Hrs  T(C): 36.4 (19 Jun 2020 07:52), Max: 36.6 (18 Jun 2020 20:07)  T(F): 97.6 (19 Jun 2020 07:52), Max: 97.8 (18 Jun 2020 20:07)  HR: 64 (19 Jun 2020 07:52) (56 - 64)  BP: 135/88 (19 Jun 2020 07:52) (134/84 - 138/87)  BP(mean): --  RR: 15 (19 Jun 2020 07:52) (15 - 16)  SpO2: 100% (19 Jun 2020 07:52) (99% - 100%)    PHYSICAL EXAM:    General: NAD, sitting in wheelchair comfortably  HEENT: NC/AT; PERRL, anicteric sclera; MMM  Neck: supple  Cardiovascular: +S1/S2, RRR  Respiratory: CTA B/L; no W/R/R, no crackles  Gastrointestinal: soft, NT/ND; normoactive bowel sounds  Extremities: warm, well perfused; no edema, clubbing or cyanosis  Neurological: AAOx3; no new deficits, weakness has improved during his stay    MEDICATIONS:  MEDICATIONS  (STANDING):  amLODIPine   Tablet 5 milliGRAM(s) Oral daily  aspirin enteric coated 81 milliGRAM(s) Oral daily  atorvastatin 40 milliGRAM(s) Oral at bedtime  clopidogrel Tablet 75 milliGRAM(s) Oral daily  enoxaparin Injectable 40 milliGRAM(s) SubCutaneous daily  hydrochlorothiazide 12.5 milliGRAM(s) Oral <User Schedule>  polyethylene glycol 3350 17 Gram(s) Oral daily  senna 2 Tablet(s) Oral at bedtime    MEDICATIONS  (PRN):  acetaminophen   Tablet .. 650 milliGRAM(s) Oral every 6 hours PRN Temp greater or equal to 38C (100.4F), Mild Pain (1 - 3)      ALLERGIES:  Allergies    No Known Allergies    Intolerances        LABS:                        14.0   7.19  )-----------( 325      ( 18 Jun 2020 05:40 )             42.3     06-18    141  |  104  |  16  ----------------------------<  98  3.5   |  27  |  0.74    Ca    8.7      18 Jun 2020 05:40          CAPILLARY BLOOD GLUCOSE          RADIOLOGY & ADDITIONAL TESTS: Reviewed. HPI:  Pt is a 54 y/o with PMHx of poorly controlled HTN who presented to [facility]/[place] on 6/4/20 with left sided facial numbness and left sided body numbness upon waking. The numbness was associated with diplopia, nausea, and 1 episode of NBNB vomiting. Pt also noticed paresthesias in his LUE. MRI revealed an acute infarct in the right anisa. Pt was COVID negative upon admission. Pt's course also showed calcified right lung granulomas on CT chest.     SUBJECTIVE / INTERVAL HPI: Patient seen and examined at bedside. Dr. López hospitalist present to translate. Pt is excited to be leaving tomorrow. He has no complaints and slept well overnight.     REVIEW OF SYSTEMS:    CONSTITUTIONAL: No fevers or chills  EYES/ENT: No visual changes;  No vertigo or throat pain   NECK: No pain or stiffness  RESPIRATORY: No cough, wheezing, or shortness of breath  CARDIOVASCULAR: No chest pain or palpitations  GASTROINTESTINAL: No abdominal or epigastric pain. No nausea or vomiting. No diarrhea or constipation.   GENITOURINARY: No dysuria, frequency or hematuria  NEUROLOGICAL: very mild left hand numbness weakness  SKIN: No itching, rashes      VITAL SIGNS:  Vital Signs Last 24 Hrs  T(C): 36.4 (19 Jun 2020 07:52), Max: 36.6 (18 Jun 2020 20:07)  T(F): 97.6 (19 Jun 2020 07:52), Max: 97.8 (18 Jun 2020 20:07)  HR: 64 (19 Jun 2020 07:52) (56 - 64)  BP: 135/88 (19 Jun 2020 07:52) (134/84 - 138/87)  BP(mean): --  RR: 15 (19 Jun 2020 07:52) (15 - 16)  SpO2: 100% (19 Jun 2020 07:52) (99% - 100%)    PHYSICAL EXAM:    General: NAD, sitting in wheelchair comfortably  HEENT: NC/AT; PERRL, anicteric sclera; MMM  Neck: supple  Cardiovascular: +S1/S2, RRR  Respiratory: CTA B/L; no W/R/R, no crackles  Gastrointestinal: soft, NT/ND; normoactive bowel sounds  Extremities: warm, well perfused; no edema, clubbing or cyanosis  Neurological: AAOx3; no new deficits, weakness has improved during his stay  Coordination: FNF and HTS intact    MEDICATIONS:  MEDICATIONS  (STANDING):  amLODIPine   Tablet 5 milliGRAM(s) Oral daily  aspirin enteric coated 81 milliGRAM(s) Oral daily  atorvastatin 40 milliGRAM(s) Oral at bedtime  clopidogrel Tablet 75 milliGRAM(s) Oral daily  enoxaparin Injectable 40 milliGRAM(s) SubCutaneous daily  hydrochlorothiazide 12.5 milliGRAM(s) Oral <User Schedule>  polyethylene glycol 3350 17 Gram(s) Oral daily  senna 2 Tablet(s) Oral at bedtime    MEDICATIONS  (PRN):  acetaminophen   Tablet .. 650 milliGRAM(s) Oral every 6 hours PRN Temp greater or equal to 38C (100.4F), Mild Pain (1 - 3)      ALLERGIES:  Allergies    No Known Allergies    Intolerances        LABS:                        14.0   7.19  )-----------( 325      ( 18 Jun 2020 05:40 )             42.3     06-18    141  |  104  |  16  ----------------------------<  98  3.5   |  27  |  0.74    Ca    8.7      18 Jun 2020 05:40          CAPILLARY BLOOD GLUCOSE          RADIOLOGY & ADDITIONAL TESTS: Reviewed.

## 2020-06-19 NOTE — PROGRESS NOTE ADULT - ATTENDING COMMENTS
Pt. seen with resident.  Agree with documentation above as per resident with amendments made as appropriate. Patient medically stable. Making progress towards rehab goals.
Pt. seen with resident.  Agree with documentation above as per resident with amendments made as appropriate. Patient medically stable. Making progress towards rehab goals.
Pt. seen with resident.  Agree with documentation above as per resident with amendments made as appropriate. Patient medically stable. Making progress towards rehab goals.     BP controlled
Pt. seen with resident.  Agree with documentation above as per resident with amendments made as appropriate. Patient medically stable. Making progress towards rehab goals.     d/c home tomorrow.  Discharge medications and instructions reviewed with pt.  All questions answered.  will f/u with outpatient therapy

## 2020-06-19 NOTE — DISCHARGE NOTE PROVIDER - NSDCCPCAREPLAN_GEN_ALL_CORE_FT
PRINCIPAL DISCHARGE DIAGNOSIS  Diagnosis: CVA (cerebrovascular accident)  Assessment and Plan of Treatment: You came into the hospital with left sided numbness and weakness. You were found to have a stroke and were started on medications. You should continue on all of your medications that were started in the hospital. You will need to follow up with your primary care physician and with neurology. You did very well with therapy.      SECONDARY DISCHARGE DIAGNOSES  Diagnosis: HTN (hypertension)  Assessment and Plan of Treatment: You have high blood pressure. You should continue on your blood pressure medications and follow up with your primary care physician.

## 2020-06-20 VITALS
HEART RATE: 62 BPM | OXYGEN SATURATION: 95 % | DIASTOLIC BLOOD PRESSURE: 87 MMHG | RESPIRATION RATE: 12 BRPM | TEMPERATURE: 98 F | SYSTOLIC BLOOD PRESSURE: 135 MMHG

## 2020-06-20 PROCEDURE — 85027 COMPLETE CBC AUTOMATED: CPT

## 2020-06-20 PROCEDURE — 97535 SELF CARE MNGMENT TRAINING: CPT

## 2020-06-20 PROCEDURE — 99238 HOSP IP/OBS DSCHRG MGMT 30/<: CPT

## 2020-06-20 PROCEDURE — 86803 HEPATITIS C AB TEST: CPT

## 2020-06-20 PROCEDURE — 92523 SPEECH SOUND LANG COMPREHEN: CPT

## 2020-06-20 PROCEDURE — 97110 THERAPEUTIC EXERCISES: CPT

## 2020-06-20 PROCEDURE — 97112 NEUROMUSCULAR REEDUCATION: CPT

## 2020-06-20 PROCEDURE — 97530 THERAPEUTIC ACTIVITIES: CPT

## 2020-06-20 PROCEDURE — 92507 TX SP LANG VOICE COMM INDIV: CPT

## 2020-06-20 PROCEDURE — 97163 PT EVAL HIGH COMPLEX 45 MIN: CPT

## 2020-06-20 PROCEDURE — 80061 LIPID PANEL: CPT

## 2020-06-20 PROCEDURE — 86769 SARS-COV-2 COVID-19 ANTIBODY: CPT

## 2020-06-20 PROCEDURE — U0003: CPT

## 2020-06-20 PROCEDURE — 97116 GAIT TRAINING THERAPY: CPT

## 2020-06-20 PROCEDURE — 97167 OT EVAL HIGH COMPLEX 60 MIN: CPT

## 2020-06-20 PROCEDURE — 80048 BASIC METABOLIC PNL TOTAL CA: CPT

## 2020-06-20 RX ORDER — AMLODIPINE BESYLATE 2.5 MG/1
1 TABLET ORAL
Qty: 30 | Refills: 0
Start: 2020-06-20 | End: 2020-07-19

## 2020-06-20 RX ORDER — ATORVASTATIN CALCIUM 80 MG/1
1 TABLET, FILM COATED ORAL
Qty: 30 | Refills: 0
Start: 2020-06-20 | End: 2020-07-19

## 2020-06-20 RX ORDER — CLOPIDOGREL BISULFATE 75 MG/1
1 TABLET, FILM COATED ORAL
Qty: 30 | Refills: 0
Start: 2020-06-20 | End: 2020-07-19

## 2020-06-20 RX ORDER — ASPIRIN/CALCIUM CARB/MAGNESIUM 324 MG
1 TABLET ORAL
Qty: 30 | Refills: 0
Start: 2020-06-20 | End: 2020-07-19

## 2020-06-20 RX ADMIN — Medication 81 MILLIGRAM(S): at 10:44

## 2020-06-20 RX ADMIN — AMLODIPINE BESYLATE 5 MILLIGRAM(S): 2.5 TABLET ORAL at 05:45

## 2020-06-20 RX ADMIN — CLOPIDOGREL BISULFATE 75 MILLIGRAM(S): 75 TABLET, FILM COATED ORAL at 10:44

## 2020-06-20 RX ADMIN — Medication 12.5 MILLIGRAM(S): at 10:44

## 2020-06-20 NOTE — PROGRESS NOTE ADULT - SUBJECTIVE AND OBJECTIVE BOX
No overnight events.  Slept well.  Reports pain is controlled.  Plan for DC today.    REVIEW OF SYSTEMS  Constitutional - No fever,  No fatigue  Musculoskeletal - No joint pain, No joint swelling, No muscle pain    VITALS  T(C): --  HR: 56 (06-20-20 @ 05:45) (56 - 58)  BP: 126/75 (06-20-20 @ 05:45) (126/75 - 131/87)  RR: 14 (06-20-20 @ 05:45) (14 - 16)  SpO2: 97% (06-20-20 @ 05:45) (97% - 97%)  Wt(kg): --       MEDICATIONS   acetaminophen   Tablet .. 650 milliGRAM(s) every 6 hours PRN  amLODIPine   Tablet 5 milliGRAM(s) daily  aspirin enteric coated 81 milliGRAM(s) daily  atorvastatin 40 milliGRAM(s) at bedtime  clopidogrel Tablet 75 milliGRAM(s) daily  enoxaparin Injectable 40 milliGRAM(s) daily  hydrochlorothiazide 12.5 milliGRAM(s) <User Schedule>  polyethylene glycol 3350 17 Gram(s) daily  senna 2 Tablet(s) at bedtime      RECENT LABS/IMAGING - Ind Reviewed                      ---------  PHYSICAL EXAM  Constitutional - NAD, Comfortable  Pulm - Breathing comfortably, No wheezing  Neurologic Exam -                    Cognitive - Awake, Alert  Psychiatric - Mood WNL     ASSESSMENT/PLAN  55y Male with functional deficits after acute CVA  CVA - ASA, Plavix, Lipitor  Pain - Tylenol  DVT PPX - SCD, Lovenox    DC today.

## 2020-06-22 PROBLEM — I10 ESSENTIAL (PRIMARY) HYPERTENSION: Chronic | Status: ACTIVE | Noted: 2020-06-11

## 2020-06-25 PROBLEM — Z00.00 ENCOUNTER FOR PREVENTIVE HEALTH EXAMINATION: Status: ACTIVE | Noted: 2020-06-25

## 2020-06-30 ENCOUNTER — APPOINTMENT (OUTPATIENT)
Dept: PHYSICAL MEDICINE AND REHAB | Facility: CLINIC | Age: 55
End: 2020-06-30
Payer: MEDICAID

## 2020-06-30 VITALS
SYSTOLIC BLOOD PRESSURE: 134 MMHG | TEMPERATURE: 98.5 F | HEART RATE: 70 BPM | OXYGEN SATURATION: 98 % | DIASTOLIC BLOOD PRESSURE: 88 MMHG

## 2020-06-30 DIAGNOSIS — Z86.79 PERSONAL HISTORY OF OTHER DISEASES OF THE CIRCULATORY SYSTEM: ICD-10-CM

## 2020-06-30 DIAGNOSIS — Z86.39 PERSONAL HISTORY OF OTHER ENDOCRINE, NUTRITIONAL AND METABOLIC DISEASE: ICD-10-CM

## 2020-06-30 DIAGNOSIS — I63.9 CEREBRAL INFARCTION, UNSPECIFIED: ICD-10-CM

## 2020-06-30 PROCEDURE — 99213 OFFICE O/P EST LOW 20 MIN: CPT

## 2020-06-30 RX ORDER — AMLODIPINE BESYLATE 5 MG/1
5 TABLET ORAL
Refills: 0 | Status: ACTIVE | COMMUNITY

## 2020-06-30 RX ORDER — ATORVASTATIN CALCIUM 80 MG/1
TABLET, FILM COATED ORAL
Refills: 0 | Status: ACTIVE | COMMUNITY

## 2020-06-30 RX ORDER — ASPIRIN 81 MG/1
81 TABLET ORAL
Refills: 0 | Status: ACTIVE | COMMUNITY

## 2020-06-30 RX ORDER — HYDROCHLOROTHIAZIDE 12.5 MG/1
12.5 CAPSULE ORAL
Refills: 0 | Status: ACTIVE | COMMUNITY

## 2020-06-30 RX ORDER — CLOPIDOGREL 75 MG/1
75 TABLET, FILM COATED ORAL
Refills: 0 | Status: ACTIVE | COMMUNITY

## 2020-06-30 NOTE — REASON FOR VISIT
[Initial Evaluation] : an initial evaluation [Spouse] : spouse [Source: ________] : History obtained from [unfilled]

## 2020-06-30 NOTE — PHYSICAL EXAM
[Normal] : Oriented to person, place, and time, insight and judgement were intact and the affect was normal [de-identified] : good tandem gait, normal static and dynamic balance

## 2020-06-30 NOTE — REVIEW OF SYSTEMS
[Patient Intake Form Reviewed] : Patient intake form was reviewed [Negative] : Heme/Lymph [de-identified] : per HPI

## 2020-06-30 NOTE — ASSESSMENT
[FreeTextEntry1] : Patient is doing very well- essentially at baseline and even the reported deficit (reading) has been improving. I told his wife that if he is still not at baseline in 2-3 weeks can get OT evaluation but at this time he seems to be progressing well. Most importantly, he needs to get a PMD to monitor his BP and cholesterol and should continue to take his medications (which were reviewed with patient and his wife) as prescribed at [facility]. Offered the name of a PMD but they have a referral from a friend in their community. \par He can follow up with me prn.

## 2020-06-30 NOTE — HISTORY OF PRESENT ILLNESS
[FreeTextEntry1] : 56 yo who sustained CVA last month- was at [facility] and then went to [facility] x 10 days.\cookie Presented with L sided numbness (no imaging information available) and difficulty with speech.\cookie Now doing very well. 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
